# Patient Record
Sex: FEMALE | Race: WHITE | NOT HISPANIC OR LATINO | Employment: PART TIME | URBAN - METROPOLITAN AREA
[De-identification: names, ages, dates, MRNs, and addresses within clinical notes are randomized per-mention and may not be internally consistent; named-entity substitution may affect disease eponyms.]

---

## 2017-03-01 NOTE — TELEPHONE ENCOUNTER
Last routine GYN exam 7/14/2016; pap WNL.  States feels like she has had an HSV episode that will not completely go away.  Complaining of a lot of irritation near her rectum where she usually gets her outbreaks.  Symptoms started around 2/18.  Denies vaginal discharge.  Notified I will discuss with  and get back with her.  Patient verbalized understanding.  Patient states if  feels it is HSV will need new Rx sent to the pharmacy.      Please advise if patient needs to be seen or if send out another prescription for Valtrex 500mg

## 2017-03-01 NOTE — TELEPHONE ENCOUNTER
----- Message from Jose M Snell sent at 3/1/2017 11:16 AM CST -----  Contact: [phone].self  Pt would like to speak with the nurse about an vaginal infection.    Please advise

## 2017-03-02 RX ORDER — VALACYCLOVIR HYDROCHLORIDE 500 MG/1
500 TABLET, FILM COATED ORAL 2 TIMES DAILY
Qty: 30 TABLET | Refills: 0 | Status: SHIPPED | OUTPATIENT
Start: 2017-03-02 | End: 2017-07-14 | Stop reason: SDUPTHER

## 2017-03-02 RX ORDER — METRONIDAZOLE 7.5 MG/G
1 GEL VAGINAL NIGHTLY
Qty: 70 G | Refills: 0 | Status: SHIPPED | OUTPATIENT
Start: 2017-03-02 | End: 2017-04-05

## 2017-03-02 NOTE — TELEPHONE ENCOUNTER
----- Message from Andrew Rivas sent at 3/2/2017  4:14 PM CST -----  Contact: self/[phone]  She is returning the nurse's call.

## 2017-03-02 NOTE — TELEPHONE ENCOUNTER
Patient was notified Dr. Wang will treat for BV as well, patient verbalized understanding. States she prefers vaginal insert (Metrogel) also requesting her refill for Valacyclovir 500 mg tablet. Last GYN exam 07/14/16 (Pap: WNL) Pharmacy updated, please advise.

## 2017-03-30 ENCOUNTER — TELEPHONE (OUTPATIENT)
Dept: OBSTETRICS AND GYNECOLOGY | Facility: CLINIC | Age: 32
End: 2017-03-30

## 2017-03-30 NOTE — TELEPHONE ENCOUNTER
----- Message from Bing Nicolasa sent at 3/29/2017 12:51 PM CDT -----  Contact: self, [phone]  Patient called in requesting to speak with you regarding her insurance.  Please advise.

## 2017-03-30 NOTE — TELEPHONE ENCOUNTER
Patient called to schedule annual for 07/14/17 at 8:15am.   Patient would like to know if Dr. Wang would consider seeing her sister on Medicaid. States her sister is in her early 40's and has been having 3 periods a month. She was unsure but thinks her sister use to see Dr. Mary within the practice and hasn't seen no one since. Patient was informed Dr. Wang does not take Medicaid but messgae would be sent. She stated Dr. Wang made an exception for her but I believe patient may have been pregnant when she was seen at [facility]. Please advise.

## 2017-04-04 ENCOUNTER — TELEPHONE (OUTPATIENT)
Dept: OBSTETRICS AND GYNECOLOGY | Facility: CLINIC | Age: 32
End: 2017-04-04

## 2017-04-04 NOTE — TELEPHONE ENCOUNTER
"States she use the Metrogel but still having an "uncomfrotable" feeling.  Patient denies vaginal discharge but sometime feels like she can smell an abnormal odor "not strong but not normal".    Patient offered appointment tomorrow @ 1015 (cancelation).  Patient states really needs an afternoon appointment.   Requesting to be seen either tomorrow afternoon, Thursday afternoon or Friday morning.    Please advise  "

## 2017-04-04 NOTE — TELEPHONE ENCOUNTER
----- Message from Madhavi Hdez sent at 4/4/2017  8:09 AM CDT -----  No. [phone]   Patient still has the infection.   Please call.

## 2017-04-05 ENCOUNTER — OFFICE VISIT (OUTPATIENT)
Dept: OBSTETRICS AND GYNECOLOGY | Facility: CLINIC | Age: 32
End: 2017-04-05
Payer: MEDICAID

## 2017-04-05 VITALS
WEIGHT: 148.56 LBS | DIASTOLIC BLOOD PRESSURE: 64 MMHG | HEIGHT: 61 IN | BODY MASS INDEX: 28.05 KG/M2 | SYSTOLIC BLOOD PRESSURE: 100 MMHG

## 2017-04-05 DIAGNOSIS — K62.89 ANAL BURNING: Primary | ICD-10-CM

## 2017-04-05 PROCEDURE — 99999 PR PBB SHADOW E&M-EST. PATIENT-LVL II: CPT | Mod: PBBFAC,,, | Performed by: OBSTETRICS & GYNECOLOGY

## 2017-04-05 PROCEDURE — 99212 OFFICE O/P EST SF 10 MIN: CPT | Mod: PBBFAC,PO | Performed by: OBSTETRICS & GYNECOLOGY

## 2017-04-05 PROCEDURE — 99213 OFFICE O/P EST LOW 20 MIN: CPT | Mod: S$PBB,,, | Performed by: OBSTETRICS & GYNECOLOGY

## 2017-04-05 NOTE — PROGRESS NOTES
"OBSTETRIC HISTORY:   OB History      Para Term  AB TAB SAB Ectopic Multiple Living    3 2 2  1 1    2         COMPREHENSIVE GYN HISTORY:  PAP History: Denies abnormal Paps.  Infection History: Reports STDs: Herpes. Denies PID.  Benign History: Denies uterine fibroids. Denies ovarian cysts. Denies endometriosis.   Cancer History: Denies cervical cancer. Denies uterine cancer or hyperplasia. Denies ovarian cancer. Denies vulvar cancer or pre-cancer. Denies vaginal cancer or pre-cancer. Denies breast cancer. Denies colon cancer.  Sexual Activity History:  reports that she currently engages in sexual activity. She reports using the following method of birth control/protection: Implant.   Menstrual History:  Every 28 days, flows for 3 days. Moderate flow.  Dysmenorrhea History: Denies dysmenorrhea.  Contraception: Nexplanon  Inserted 2014     HPI:   31 y.o.  No LMP recorded. Patient has had an implant.   Patient is  here complaining of uncomfortable feeling near anus where she previously had HSV (she actually feels better now). Gets slight burning when she wipes. She occ. Uses baby wipes.. She denies bladder, breast and bowel complaints.    ROS:  GENERAL: Denies weight gain or weight loss. Feeling well overall.   SKIN: Denies rash or lesions.   HEAD: Denies headache.   NODES: Denies enlarged lymph nodes.   CHEST: Denies shortness of breath.   ABDOMEN: No abdominal pain, constipation, diarrhea, nausea, vomiting or rectal bleeding.   URINARY: No frequency, dysuria, hematuria, or burning on urination.  REPRODUCTIVE: See HPI.   BREASTS: The patient denies pain, lumps, or nipple discharge.   HEMATOLOGIC: No easy bruisability.   MUSCULOSKELETAL: Denies joint pain or back pain.   NEUROLOGIC: Denies weakness.   PSYCHIATRIC: Denies depression, anxiety or mood swings.    PE:   /64  Ht 5' 1" (1.549 m)  Wt 67.4 kg (148 lb 9.4 oz)  BMI 28.08 kg/m2  APPEARANCE: Well nourished, well developed, in no acute " distress.  ABDOMEN: Soft. No tenderness or masses. No hernias.  PELVIC:   VULVA/ANUS: No lesions but there maybe a resolving external hemorrhoid. No erosive lesions noted. Normal female genitalia.    ASSESSMENT:  Anal discomfort-- most likely hemorrhoid but since better could have also been HSV    PLAN:  RTO prn and for annual

## 2017-07-10 ENCOUNTER — TELEPHONE (OUTPATIENT)
Dept: OBSTETRICS AND GYNECOLOGY | Facility: CLINIC | Age: 32
End: 2017-07-10

## 2017-07-10 NOTE — TELEPHONE ENCOUNTER
Wanted to know if she is having her Nexplanon replaced or just having her annual Friday?  Notified we are only doing her annual on Friday and we can have her sign the order for her replacement at that time.  Once we have the product she will return for replacement.  Patient verbalized understanding

## 2017-07-10 NOTE — TELEPHONE ENCOUNTER
----- Message from Andreina Duque sent at 7/10/2017  1:38 PM CDT -----  Contact: [phone]/self  Patient called in requesting to speak with you. Patient prefers to speak with a nurse. Please advise.

## 2017-07-14 ENCOUNTER — LAB VISIT (OUTPATIENT)
Dept: LAB | Facility: HOSPITAL | Age: 32
End: 2017-07-14
Attending: OBSTETRICS & GYNECOLOGY
Payer: MEDICAID

## 2017-07-14 ENCOUNTER — OFFICE VISIT (OUTPATIENT)
Dept: OBSTETRICS AND GYNECOLOGY | Facility: CLINIC | Age: 32
End: 2017-07-14
Payer: MEDICAID

## 2017-07-14 VITALS
WEIGHT: 151.44 LBS | SYSTOLIC BLOOD PRESSURE: 110 MMHG | HEIGHT: 61 IN | DIASTOLIC BLOOD PRESSURE: 62 MMHG | BODY MASS INDEX: 28.59 KG/M2

## 2017-07-14 DIAGNOSIS — Z01.419 ROUTINE GYNECOLOGICAL EXAMINATION: Primary | ICD-10-CM

## 2017-07-14 DIAGNOSIS — N89.8 VAGINAL DISCHARGE: ICD-10-CM

## 2017-07-14 DIAGNOSIS — Z12.4 SCREENING FOR MALIGNANT NEOPLASM OF THE CERVIX: ICD-10-CM

## 2017-07-14 DIAGNOSIS — Z11.3 SCREEN FOR STD (SEXUALLY TRANSMITTED DISEASE): ICD-10-CM

## 2017-07-14 DIAGNOSIS — R35.0 URINARY FREQUENCY: ICD-10-CM

## 2017-07-14 PROCEDURE — 88175 CYTOPATH C/V AUTO FLUID REDO: CPT

## 2017-07-14 PROCEDURE — 86703 HIV-1/HIV-2 1 RESULT ANTBDY: CPT

## 2017-07-14 PROCEDURE — 86803 HEPATITIS C AB TEST: CPT

## 2017-07-14 PROCEDURE — 99999 PR PBB SHADOW E&M-EST. PATIENT-LVL II: CPT | Mod: PBBFAC,,, | Performed by: OBSTETRICS & GYNECOLOGY

## 2017-07-14 PROCEDURE — 87660 TRICHOMONAS VAGIN DIR PROBE: CPT

## 2017-07-14 PROCEDURE — 87591 N.GONORRHOEAE DNA AMP PROB: CPT

## 2017-07-14 PROCEDURE — 36415 COLL VENOUS BLD VENIPUNCTURE: CPT

## 2017-07-14 PROCEDURE — 99212 OFFICE O/P EST SF 10 MIN: CPT | Mod: PBBFAC,PO | Performed by: OBSTETRICS & GYNECOLOGY

## 2017-07-14 PROCEDURE — 87340 HEPATITIS B SURFACE AG IA: CPT

## 2017-07-14 PROCEDURE — 86592 SYPHILIS TEST NON-TREP QUAL: CPT

## 2017-07-14 PROCEDURE — 87480 CANDIDA DNA DIR PROBE: CPT

## 2017-07-14 PROCEDURE — 99395 PREV VISIT EST AGE 18-39: CPT | Mod: S$PBB,,, | Performed by: OBSTETRICS & GYNECOLOGY

## 2017-07-14 PROCEDURE — 87086 URINE CULTURE/COLONY COUNT: CPT

## 2017-07-14 RX ORDER — BUPROPION HYDROCHLORIDE 300 MG/1
300 TABLET ORAL EVERY MORNING
Qty: 30 TABLET | Refills: 2 | Status: SHIPPED | OUTPATIENT
Start: 2017-07-14 | End: 2017-09-28

## 2017-07-14 RX ORDER — VALACYCLOVIR HYDROCHLORIDE 500 MG/1
500 TABLET, FILM COATED ORAL 2 TIMES DAILY
Qty: 30 TABLET | Refills: 5 | Status: SHIPPED | OUTPATIENT
Start: 2017-07-14 | End: 2017-12-22 | Stop reason: SDUPTHER

## 2017-07-14 NOTE — PROGRESS NOTES
"OBSTETRIC HISTORY:   OB History      Para Term  AB Living    3 2 2   1 2    SAB TAB Ectopic Multiple Live Births      1     2         COMPREHENSIVE GYN HISTORY:  PAP History: Denies abnormal Paps.  Infection History: Reports STDs: Herpes. Denies PID.  Benign History: Denies uterine fibroids. Denies ovarian cysts. Denies endometriosis.   Cancer History: Denies cervical cancer. Denies uterine cancer or hyperplasia. Denies ovarian cancer. Denies vulvar cancer or pre-cancer. Denies vaginal cancer or pre-cancer. Denies breast cancer. Denies colon cancer.  Sexual Activity History:  reports that she currently engages in sexual activity. She reports using the following method of birth control/protection: Implant.   Menstrual History:  Every 28 days, flows for 3 days. Moderate flow.  Dysmenorrhea History: Denies dysmenorrhea.  Contraception: Nexplanon  Inserted 2014      HPI:   32 y.o.  Patient's last menstrual period was 2017.   Patient is  here for her annual gynecologic exam.  She has complaints urinary frequency, vulvar irritation and wants std check. Also, states she is a hoarder and wants to know if there is any medication to reduce her anxiety related to her hoarding. She denies breast and bowel complaints.    ROS:  GENERAL: Denies weight gain or weight loss. Feeling well overall.   SKIN: Denies rash or lesions.   HEAD: Denies headache.   NODES: Denies enlarged lymph nodes.   CHEST: Denies shortness of breath.   ABDOMEN: No abdominal pain, constipation, diarrhea, nausea, vomiting or rectal bleeding.   URINARY: No dysuria, hematuria, or burning on urination.  REPRODUCTIVE: See HPI.   BREASTS: The patient denies pain, lumps, or nipple discharge.   HEMATOLOGIC: No easy bruisability.   MUSCULOSKELETAL: Denies joint pain or back pain.   NEUROLOGIC: Denies weakness.   PSYCHIATRIC: Denies depression, anxiety or mood swings.    PE:   /62   Ht 5' 1" (1.549 m)   Wt 68.7 kg (151 lb 7.3 oz)   LMP " 03/01/2017   BMI 28.62 kg/m²   APPEARANCE: Well nourished, well developed, in no acute distress.  NECK: Neck symmetric without  thyromegaly.  NODES: No inguinal, cervical lymph node enlargement.  CHEST: Lungs clear to auscultation.  HEART: Regular rate and rhythm, no murmurs, rubs or gallops.  ABDOMEN: Soft. No tenderness or masses. No hernias.  BREASTS: Symmetrical, no skin changes or visible lesions. No palpable masses, nipple discharge or adenopathy bilaterally.  PELVIC:   VULVA: No lesions. Normal female genitalia.  URETHRAL MEATUS: Normal size and location, no lesions, no prolapse.  URETHRA: No masses, tenderness, prolapse or scarring.  VAGINA: Moist and well rugated, minimal discharge, no significant cystocele or rectocele.  CERVIX: No lesions and discharge.  UTERUS: Normal size, regular shape, mobile, non-tender, bladder base nontender.  ADNEXA: No tenderness. Left adnexal fullness.    PROCEDURES:  Pap smear  UA=Leukocytes  UPT=negative  GC/CT/Affirm  HIV, RPR, Hep B, Hep C    Assessment:  Normal Gynecologic Exam--replace Nexplanon soon  STD check  Vaginal discharge  Left ovarian cyst  Urinary frequency  Hoarding-- try Wellbutrin and if not better try Buspar    Plan:  Mammogram and Colonoscopy if indicated by current recommendations.  Return to clinic in one year or for any problems or complaints.    Counseling:  Patient was counseled today on A.C.S. Pap guidelines and recommendations for yearly pelvic exams and monthly self breast exams; to see her PCP for other health maintenance. Regular exercise and healthy diet.

## 2017-07-15 LAB
C TRACH DNA SPEC QL NAA+PROBE: NOT DETECTED
N GONORRHOEA DNA SPEC QL NAA+PROBE: NOT DETECTED
RPR SER QL: NORMAL

## 2017-07-16 ENCOUNTER — TELEPHONE (OUTPATIENT)
Dept: OBSTETRICS AND GYNECOLOGY | Facility: HOSPITAL | Age: 32
End: 2017-07-16

## 2017-07-16 LAB
BACTERIA UR CULT: NORMAL
BACTERIA UR CULT: NORMAL
CANDIDA RRNA VAG QL PROBE: NEGATIVE
G VAGINALIS RRNA GENITAL QL PROBE: NEGATIVE
T VAGINALIS RRNA GENITAL QL PROBE: NEGATIVE

## 2017-07-17 ENCOUNTER — TELEPHONE (OUTPATIENT)
Dept: OBSTETRICS AND GYNECOLOGY | Facility: CLINIC | Age: 32
End: 2017-07-17

## 2017-07-17 LAB
HBV SURFACE AG SERPL QL IA: NEGATIVE
HCV AB SERPL QL IA: NEGATIVE
HIV 1+2 AB+HIV1 P24 AG SERPL QL IA: NEGATIVE

## 2017-08-02 ENCOUNTER — TELEPHONE (OUTPATIENT)
Dept: OBSTETRICS AND GYNECOLOGY | Facility: CLINIC | Age: 32
End: 2017-08-02

## 2017-08-02 RX ORDER — TRIAMCINOLONE ACETONIDE 1 MG/G
CREAM TOPICAL 2 TIMES DAILY
Qty: 45 G | Refills: 0 | Status: SHIPPED | OUTPATIENT
Start: 2017-08-02 | End: 2017-09-28

## 2017-08-02 NOTE — TELEPHONE ENCOUNTER
Notify all cultures negative 7/14/17.  I will send a steroid cream to pharmacy for her to use BID x 2 weeks on vulva

## 2017-08-02 NOTE — TELEPHONE ENCOUNTER
Patient has complaints of irritation. States that there is a small amount of discharge. Notices the irritation more when she wipes. No reports of odor or breakout. No painful or burning urination.    Please advise.

## 2017-08-02 NOTE — TELEPHONE ENCOUNTER
Patient informed that rx has been called in to the pharmacy. Patient directed to use medication externally to affected area. If symptoms do not resolve patient is to schedule appt for evaluation. All questions answered and patient verbalized understanding.

## 2017-08-02 NOTE — TELEPHONE ENCOUNTER
----- Message from Andreina Duque sent at 8/2/2017  2:09 PM CDT -----  Contact: [phone]/self  Patient requesting to speak with you regarding medication for yeast infection. Please advise.

## 2017-08-16 ENCOUNTER — TELEPHONE (OUTPATIENT)
Dept: OBSTETRICS AND GYNECOLOGY | Facility: CLINIC | Age: 32
End: 2017-08-16

## 2017-08-16 DIAGNOSIS — N94.9 ADNEXAL FULLNESS: Primary | ICD-10-CM

## 2017-08-16 NOTE — TELEPHONE ENCOUNTER
Patient calling to check status of birth control, states she brought her new insurance information last Wednesday.

## 2017-08-16 NOTE — TELEPHONE ENCOUNTER
----- Message from Diann Ty sent at 8/16/2017  8:53 AM CDT -----  Contact: [phone]  Patient states she left a message to get her birth control change and would like to get an appt. To see the  To discuss

## 2017-08-29 NOTE — TELEPHONE ENCOUNTER
----- Message from Cassidy Weaver sent at 8/29/2017  9:36 AM CDT -----  Contact: [phone]/ self   Patient called in requesting to speak with you. Did not specify why. Please advise.

## 2017-08-31 NOTE — TELEPHONE ENCOUNTER
Patient states she is unable to schedule her ultrasound because there is no order in.  Also waiting to hear about her Nexplanon.   Notified [facility] may be trying to contact her to confirm shipment request.  Patient states she has not received a call.  Notified I will get an order for her ultrasound and contact [facility] to find out the status of her Nexplanon.    Per Alonzo at [facility] they have attempted to contact patient and have not been able to reach her.  I confirmed the contact patient for the patient.  Per Alonzo patient can call [facility] at [phone] to complete her enrollment and authorize shipment.    Please sign pended ultrasound order and route back

## 2017-08-31 NOTE — TELEPHONE ENCOUNTER
----- Message from Diann Ty sent at 8/31/2017 11:40 AM CDT -----  Contact: [phone]  Patient is returning your call

## 2017-09-01 ENCOUNTER — TELEPHONE (OUTPATIENT)
Dept: OBSTETRICS AND GYNECOLOGY | Facility: CLINIC | Age: 32
End: 2017-09-01

## 2017-09-01 NOTE — TELEPHONE ENCOUNTER
----- Message from Bing Baldwin sent at 9/1/2017 12:29 PM CDT -----  Contact: [facility], [phone]  Calling to schedule Nexplanon delivery. Please advise.

## 2017-09-01 NOTE — TELEPHONE ENCOUNTER
Patient notified and verbalized understanding.  Patient will call [facility] and U/S only scheduled for 9/8/17 @ 8679

## 2017-09-08 ENCOUNTER — TELEPHONE (OUTPATIENT)
Dept: OBSTETRICS AND GYNECOLOGY | Facility: CLINIC | Age: 32
End: 2017-09-08

## 2017-09-08 ENCOUNTER — OFFICE VISIT (OUTPATIENT)
Dept: OBSTETRICS AND GYNECOLOGY | Facility: CLINIC | Age: 32
End: 2017-09-08
Payer: MEDICAID

## 2017-09-08 DIAGNOSIS — N94.9 ADNEXAL FULLNESS: ICD-10-CM

## 2017-09-08 PROCEDURE — 76830 TRANSVAGINAL US NON-OB: CPT | Mod: 26,S$PBB,, | Performed by: OBSTETRICS & GYNECOLOGY

## 2017-09-08 PROCEDURE — 76830 TRANSVAGINAL US NON-OB: CPT | Mod: PBBFAC,PO

## 2017-09-08 NOTE — TELEPHONE ENCOUNTER
Nexplanon received in the office.  Notified per  can do removal with insertion next Thursday 9/14/17 @ 1pm.  Patient verbalized understanding

## 2017-09-08 NOTE — TELEPHONE ENCOUNTER
Notify ultrasound showed left ovarian cyst. No treatment needed. RTO 3 months for follow up ultrasound

## 2017-09-11 ENCOUNTER — TELEPHONE (OUTPATIENT)
Dept: OBSTETRICS AND GYNECOLOGY | Facility: CLINIC | Age: 32
End: 2017-09-11

## 2017-09-11 NOTE — TELEPHONE ENCOUNTER
----- Message from Yari Shetty sent at 9/11/2017 12:19 PM CDT -----  Contact: self/[phone]  Patient needs to reschedule her appointment for the Nexplanon removal/re-insertion.    Please call and advise.

## 2017-09-15 ENCOUNTER — TELEPHONE (OUTPATIENT)
Dept: OBSTETRICS AND GYNECOLOGY | Facility: CLINIC | Age: 32
End: 2017-09-15

## 2017-09-15 NOTE — TELEPHONE ENCOUNTER
----- Message from Shana Chandler sent at 9/15/2017  2:33 PM CDT -----  Contact: Self [phone]  Patient Returning Your Phone Call

## 2017-09-18 NOTE — TELEPHONE ENCOUNTER
----- Message from Jose M Snell sent at 9/18/2017  4:39 PM CDT -----  Contact: [phone]/self   Pt states she's returning your call.    Please call and advise

## 2017-09-28 ENCOUNTER — OFFICE VISIT (OUTPATIENT)
Dept: OBSTETRICS AND GYNECOLOGY | Facility: CLINIC | Age: 32
End: 2017-09-28
Payer: MEDICAID

## 2017-09-28 VITALS
BODY MASS INDEX: 27.8 KG/M2 | DIASTOLIC BLOOD PRESSURE: 66 MMHG | HEIGHT: 61 IN | WEIGHT: 147.25 LBS | SYSTOLIC BLOOD PRESSURE: 110 MMHG

## 2017-09-28 DIAGNOSIS — Z30.017 NEXPLANON INSERTION: ICD-10-CM

## 2017-09-28 DIAGNOSIS — Z30.46 ENCOUNTER FOR NEXPLANON REMOVAL: Primary | ICD-10-CM

## 2017-09-28 LAB
B-HCG UR QL: NEGATIVE
CTP QC/QA: YES

## 2017-09-28 PROCEDURE — 11981 INSERTION DRUG DLVR IMPLANT: CPT | Mod: PBBFAC,PO | Performed by: OBSTETRICS & GYNECOLOGY

## 2017-09-28 PROCEDURE — 11983 REMOVE/INSERT DRUG IMPLANT: CPT | Mod: S$PBB,,, | Performed by: OBSTETRICS & GYNECOLOGY

## 2017-09-28 PROCEDURE — 99212 OFFICE O/P EST SF 10 MIN: CPT | Mod: PBBFAC,PO,25 | Performed by: OBSTETRICS & GYNECOLOGY

## 2017-09-28 PROCEDURE — 99999 PR PBB SHADOW E&M-EST. PATIENT-LVL II: CPT | Mod: PBBFAC,,, | Performed by: OBSTETRICS & GYNECOLOGY

## 2017-09-28 PROCEDURE — 11982 REMOVE DRUG IMPLANT DEVICE: CPT | Mod: PBBFAC,PO | Performed by: OBSTETRICS & GYNECOLOGY

## 2017-09-28 PROCEDURE — 81025 URINE PREGNANCY TEST: CPT | Mod: PBBFAC,PO | Performed by: OBSTETRICS & GYNECOLOGY

## 2017-09-28 PROCEDURE — 99499 UNLISTED E&M SERVICE: CPT | Mod: S$PBB,,, | Performed by: OBSTETRICS & GYNECOLOGY

## 2017-09-28 NOTE — PROGRESS NOTES
IMPLANON REMOVAL:    32 y.o. female   OB History      Para Term  AB Living    3 2 2   1 2    SAB TAB Ectopic Multiple Live Births      1     2        with Patient's last menstrual period was 2017.  presents for Implanon removal because patient desires pregnancy or different birth control option.      PRE-IMPLANON REMOVAL COUNSELING:  The patient was advised of minimal risks of bleeding and pain and she agrees to proceed.    EXAM:  Implanon palpable by palpation or imaging.  The end closest to the elbow was marked.    PROCEDURE:  TIME OUT PERFORMED.  The patient was placed in same position as for insertion.  The area was prepped with antiseptic.  2 cc of 1% Xylocaine with epinephrine was injected just underneath end of implant closest to the elbow.  Downward pressure was placed on the end of the implant closest to the axilla.  Using sterile technique, a 2-3 mm incision was made in longitudinal direction of arm at tip of the implant closest to the elbow.  The implant was gently pushed toward the incision until tip was visible.  The entire 4 cm implant was removed.  The new Nexplanon was then inserted. The patient tolerated the procedure well.    ASSESSMENT:  Contraceptive Management / Removal Implanon. V25.0.

## 2017-09-28 NOTE — PROGRESS NOTES
PRE-NEXPLANON INSERTION COUNSELING:  All contraceptive options were reviewed and the patient chooses Nexplanon.  Patients history was reviewed and there were no contraindications to Nexplanon.  The procedure and minimal risks of pain, bleeding, bruising and infection at the insertion site discussed. Possible irregular menstrual bleeding pattern versus amenorrhea was explained.  No protection against STDs discussed.  Written information provided; all questions answered and patient agrees to proceed.  Consent signed and scanned into computer.    LOT #  [id number]   EXP: 1/2020    EXAM:  With patient in supine position the nondominant arm was flexed at the elbow and externally rotated.  The insertion site was identified 6-8 cm above the elbow crease at the inner side of the upper arm overlying the groove between biceps and triceps.  The insertion site was marked and a second tiffanie was placed 6-8 cm above the first.    PROCEDURE:  TIME OUT PERFORMED.  The insertion site was prepped with antiseptic and injected with 8 cc of 1% Xylocaine with epinephrine subq along the planned insertion canal.  Xylocaine subq along the planned insertion canal.  Using sterile technique the Implanon applicator was visually verified and removed from the blister pack.  The base of the applicator was gently tapped with the needle pointed up until the implant disappeared back into the needle and the needle cap was removed.  The needle tip was inserted bevel side up at a 20 degree angle to penetrate the skin.  The applicator was lowered parallel to the arm and the skin was tented with the needle.  The seal of the applicator was broken by pressing the obturator support and turned 90degrees.  The obturator tip was fixed in place on the arm with one hand and with the other hand the needle was slowly and fully retracted back along full length of the obturator.  The grooved tip of the obturator was visible inside the needle and the implant waspalpable  after insertion.  A small adhesive bandage and then a pressure bandage was placed over the insertion site. The patient tolerated the procedure well.    ASSESSMENT:  1. Contraception management / Nexplanon insertion.V25.0.    POST IMPLANON INSERTION COUNSELING:  Manage post Implanon placement arm pain with NSAIDs, Tylenol or Rx per MedCard.  Keep arm elevated and apply intermittent ice packs to decrease pain and bruising for 24 Hours.  May remove bandage in 24 hours.  Implanon danger signs (worsening pain at insertion site, bleeding through bandage, redness and/or pus drainage at insertion site).  Removal in 3 years.    Counseling lasted approximately 15 minutes and all her questions were answered.

## 2017-12-19 ENCOUNTER — TELEPHONE (OUTPATIENT)
Dept: OBSTETRICS AND GYNECOLOGY | Facility: CLINIC | Age: 32
End: 2017-12-19

## 2017-12-19 NOTE — TELEPHONE ENCOUNTER
Returned pt's call. Pt states she is experiencing vaginal irritation, no itching, burning or discharge just a little odor. Pt states she still have Kenalog cream Dr. Wang prescribed and wanted to know if she can use it. Informed pt Dr. Wang is out of the office and to try the cream and f/u next week if symptoms persist or get worse. Patient verbalized understanding.

## 2017-12-19 NOTE — TELEPHONE ENCOUNTER
----- Message from Yari Shetty sent at 12/19/2017  8:36 AM CST -----  Contact: self/[phone]  Patient called to ask Deirdre a question in regard to a previous medication for feminine irritation.      Please call and  Advise.

## 2017-12-22 ENCOUNTER — OFFICE VISIT (OUTPATIENT)
Dept: URGENT CARE | Facility: CLINIC | Age: 32
End: 2017-12-22
Payer: MEDICAID

## 2017-12-22 VITALS
RESPIRATION RATE: 16 BRPM | DIASTOLIC BLOOD PRESSURE: 76 MMHG | SYSTOLIC BLOOD PRESSURE: 111 MMHG | BODY MASS INDEX: 28.22 KG/M2 | OXYGEN SATURATION: 99 % | WEIGHT: 140 LBS | HEART RATE: 74 BPM | TEMPERATURE: 98 F | HEIGHT: 59 IN

## 2017-12-22 DIAGNOSIS — N39.0 URINARY TRACT INFECTION WITHOUT HEMATURIA, SITE UNSPECIFIED: ICD-10-CM

## 2017-12-22 DIAGNOSIS — N76.0 ACUTE VAGINITIS: ICD-10-CM

## 2017-12-22 DIAGNOSIS — R10.9 ABDOMINAL PAIN, UNSPECIFIED ABDOMINAL LOCATION: Primary | ICD-10-CM

## 2017-12-22 LAB
B-HCG UR QL: NEGATIVE
BILIRUB UR QL STRIP: NEGATIVE
CTP QC/QA: YES
GLUCOSE UR QL STRIP: NEGATIVE
KETONES UR QL STRIP: NEGATIVE
LEUKOCYTE ESTERASE UR QL STRIP: POSITIVE
PH, POC UA: 5.5 (ref 5–8)
POC BLOOD, URINE: NEGATIVE
POC NITRATES, URINE: NEGATIVE
PROT UR QL STRIP: NEGATIVE
SP GR UR STRIP: 1.03 (ref 1–1.03)
UROBILINOGEN UR STRIP-ACNC: ABNORMAL (ref 0.1–1.1)

## 2017-12-22 PROCEDURE — 81003 URINALYSIS AUTO W/O SCOPE: CPT | Mod: QW,S$GLB,, | Performed by: SURGERY

## 2017-12-22 PROCEDURE — 81025 URINE PREGNANCY TEST: CPT | Mod: S$GLB,,, | Performed by: SURGERY

## 2017-12-22 PROCEDURE — 99203 OFFICE O/P NEW LOW 30 MIN: CPT | Mod: 25,S$GLB,, | Performed by: SURGERY

## 2017-12-22 RX ORDER — FLUCONAZOLE 150 MG/1
150 TABLET ORAL DAILY
Qty: 1 TABLET | Refills: 2 | Status: SHIPPED | OUTPATIENT
Start: 2017-12-22 | End: 2017-12-23

## 2017-12-22 RX ORDER — VALACYCLOVIR HYDROCHLORIDE 500 MG/1
500 TABLET, FILM COATED ORAL 2 TIMES DAILY
Qty: 6 TABLET | Refills: 10 | Status: SHIPPED | OUTPATIENT
Start: 2017-12-22 | End: 2018-08-20

## 2017-12-22 RX ORDER — CLOTRIMAZOLE AND BETAMETHASONE DIPROPIONATE 10; .64 MG/G; MG/G
CREAM TOPICAL
Qty: 15 G | Refills: 1 | Status: SHIPPED | OUTPATIENT
Start: 2017-12-22 | End: 2018-08-20

## 2017-12-22 NOTE — PROGRESS NOTES
"Subjective:       Patient ID: Deirdre Rodriguez is a 32 y.o. female.    Vitals:  height is 4' 11" (1.499 m) and weight is 63.5 kg (140 lb). Her tympanic temperature is 98 °F (36.7 °C). Her blood pressure is 111/76 and her pulse is 74. Her respiration is 16 and oxygen saturation is 99%.     Chief Complaint: Dysuria and Vaginal Discharge    Patient states her partner used a condom on 12/19/2017 and her symptoms started on 12/20/2017. She has a hx of prior HSV infection and think may be having an outbreak. She thinks she has an yeast infection/allergic reaction to latex.       Dysuria    This is a new problem. The current episode started in the past 7 days. The problem occurs every urination. The problem has been unchanged. The quality of the pain is described as burning and aching. The pain is at a severity of 4/10. The pain is mild. There has been no fever. She is sexually active. Pertinent negatives include no chills, hematuria, nausea, urgency or vomiting. She has tried nothing for the symptoms.   Vaginal Discharge   The patient's primary symptoms include a genital odor and vaginal discharge. The patient's pertinent negatives include no missed menses. This is a new problem. The current episode started in the past 7 days. The problem occurs constantly. The problem has been gradually worsening. The patient is experiencing no pain. She is not pregnant. Associated symptoms include dysuria. Pertinent negatives include no abdominal pain, back pain, chills, fever, hematuria, nausea, urgency or vomiting. The vaginal discharge was clear and thin. There has been no bleeding. She has not been passing clots. She has not been passing tissue. Nothing aggravates the symptoms. Treatments tried: Triamcinolone 0.1% cream. The treatment provided no relief. She is sexually active. No, her partner does not have an STD. She uses an IUD for contraception. Her menstrual history has been irregular.     Review of Systems   Constitution: Negative " for chills and fever.   Skin: Negative for itching.   Musculoskeletal: Negative for back pain.   Gastrointestinal: Negative for abdominal pain, nausea and vomiting.   Genitourinary: Positive for dysuria and vaginal discharge. Negative for genital sores, hematuria, missed menses, non-menstrual bleeding and urgency.       Objective:      Physical Exam   Constitutional: She is oriented to person, place, and time. She appears well-developed and well-nourished. She is cooperative.  Non-toxic appearance. She does not appear ill. No distress.   HENT:   Head: Normocephalic and atraumatic.   Right Ear: Hearing, tympanic membrane, external ear and ear canal normal.   Left Ear: Hearing, tympanic membrane, external ear and ear canal normal.   Nose: No nasal deformity. No epistaxis. Right sinus exhibits no maxillary sinus tenderness and no frontal sinus tenderness. Left sinus exhibits no maxillary sinus tenderness and no frontal sinus tenderness.   Mouth/Throat: Uvula is midline, oropharynx is clear and moist and mucous membranes are normal. No trismus in the jaw. Normal dentition. No uvula swelling. No posterior oropharyngeal erythema.   Eyes: Conjunctivae and lids are normal. No scleral icterus.   Sclera clear bilat   Neck: Trachea normal, full passive range of motion without pain and phonation normal. Neck supple.   Cardiovascular: Normal rate, regular rhythm, normal heart sounds, intact distal pulses and normal pulses.    Pulmonary/Chest: Effort normal and breath sounds normal. No respiratory distress.   Abdominal: Soft. Normal appearance and bowel sounds are normal. She exhibits no distension. There is no tenderness.   Musculoskeletal: Normal range of motion. She exhibits no edema or deformity.   Neurological: She is alert and oriented to person, place, and time. She exhibits normal muscle tone. Coordination normal.   Skin: Skin is warm, dry and intact. She is not diaphoretic. No pallor.   Psychiatric: She has a normal mood  and affect. Her speech is normal and behavior is normal. Judgment and thought content normal. Cognition and memory are normal.   Nursing note and vitals reviewed.    Gyn exam deferred per request  Assessment:       1. Abdominal pain, unspecified abdominal location        Plan:         Abdominal pain, unspecified abdominal location  -     POCT Urinalysis, Dipstick, Automated, W/O Scope  -     POCT urine pregnancy  -     fluconazole (DIFLUCAN) 150 MG Tab; Take 1 tablet (150 mg total) by mouth once daily.  Dispense: 1 tablet; Refill: 2  -     C. trachomatis/N. gonorrhoeae by AMP DNA Urine  -     Urine culture  -     valACYclovir (VALTREX) 500 MG tablet; Take 1 tablet (500 mg total) by mouth 2 (two) times daily.  Dispense: 6 tablet; Refill: 10    Other orders  -     clotrimazole-betamethasone 1-0.05% (LOTRISONE) cream; Apply to affected area 2 times daily  Dispense: 15 g; Refill: 1

## 2017-12-22 NOTE — PATIENT INSTRUCTIONS
"  Dysuria     Painful urination (dysuria) is often caused by a problem in the urinary tract.   Dysuria is pain felt during urination. It is often described as a burning. Learn more about this problem and how it can be treated.  What causes dysuria?  Possible causes include:  · Infection with a bacteria or virus such as a urinary tract infection (UTI or a sexually transmitted infection (STI)  · Sensitivity or allergy to chemicals such as those found in lotions and other products  · Prostate or bladder problems  · Radiation therapy to the pelvic area  How is dysuria diagnosed?  Your healthcare provider will examine you. He or she will ask about your symptoms and health. After talking with you and doing a physical exam, your healthcare provider may know what is causing your dysuria. He or she will usually request  a sample of your urine. Tests of your urine, or a "urinalysis," are done. A urinalysis may include:  · Looking at the urine sample (visual exam)  · Checking for substances (chemical exam)  · Looking at a small amount under a microscope (microscopic exam)  Some parts of the urinalysis may be done in the provider's office and some in a lab. And, the urine sample may be checked for bacteria and yeast (urine culture). Your healthcare provider will tell you more about these tests if they are needed.  How is dysuria treated?  Treatment depends on the cause. If you have a bacterial infection, you may need antibiotics. You may be given medicines to make it easier for you to urinate and help relieve pain. Your healthcare provider can tell you more about your treatment options. Untreated, symptoms may get worse.  When to call your healthcare provider  Call the healthcare provider right away if you have any of the following:  · Fever of 100.4°F (38°C) or higher   · No improvement after three days of treatment  · Trouble urinating because of pain  · New or increased discharge from the vagina or penis  · Rash or joint " pain  · Increased back or abdominal pain  · Enlarged painful lymph nodes (lumps) in the groin   Date Last Reviewed: 1/1/2017  © 8601-8755 [employer]. [address] [place] [address], [place] [address]. All rights reserved. This information is not intended as a substitute for professional medical care. Always follow your healthcare professional's instructions.

## 2017-12-27 ENCOUNTER — TELEPHONE (OUTPATIENT)
Dept: URGENT CARE | Facility: CLINIC | Age: 32
End: 2017-12-27

## 2017-12-27 PROBLEM — N39.0 URINARY TRACT INFECTION WITHOUT HEMATURIA: Status: ACTIVE | Noted: 2017-12-27

## 2017-12-27 LAB
BACTERIA UR CULT: NORMAL
C TRACH RRNA SPEC QL NAA+PROBE: NEGATIVE
N GONORRHOEA RRNA SPEC QL NAA+PROBE: NEGATIVE

## 2017-12-27 RX ORDER — NITROFURANTOIN 25; 75 MG/1; MG/1
100 CAPSULE ORAL 2 TIMES DAILY
Qty: 14 CAPSULE | Refills: 0 | Status: SHIPPED | OUTPATIENT
Start: 2017-12-27 | End: 2018-01-03

## 2017-12-28 NOTE — TELEPHONE ENCOUNTER
Called patient, left message informing patient to call office back. I was calling to inform patient of her results. Her urine culture did show some bacteria that we need to treat.

## 2017-12-28 NOTE — TELEPHONE ENCOUNTER
----- Message from Phyllis Shelton MD sent at 12/27/2017  5:10 PM CST -----  Please call the patient regarding her abnormal result.  Her urine culture did show some bacteria that we need to treat.  I will send an antibiotic to her pharmacy. Take it till gone and follow up with PCP.

## 2018-02-06 ENCOUNTER — CLINICAL SUPPORT (OUTPATIENT)
Dept: URGENT CARE | Facility: CLINIC | Age: 33
End: 2018-02-06

## 2018-02-06 ENCOUNTER — TELEPHONE (OUTPATIENT)
Dept: OBSTETRICS AND GYNECOLOGY | Facility: CLINIC | Age: 33
End: 2018-02-06

## 2018-02-06 DIAGNOSIS — Z00.00 PHYSICAL EXAM: Primary | ICD-10-CM

## 2018-02-06 DIAGNOSIS — Z02.83 ENCOUNTER FOR DRUG SCREENING: Primary | ICD-10-CM

## 2018-02-06 PROCEDURE — 80305 DRUG TEST PRSMV DIR OPT OBS: CPT | Mod: S$GLB,,, | Performed by: PREVENTIVE MEDICINE

## 2018-02-06 PROCEDURE — 99199 UNLISTED SPECIAL SVC PX/RPRT: CPT | Mod: S$GLB,,, | Performed by: PREVENTIVE MEDICINE

## 2018-02-06 PROCEDURE — 99499 UNLISTED E&M SERVICE: CPT | Mod: S$GLB,,, | Performed by: PREVENTIVE MEDICINE

## 2018-02-06 NOTE — TELEPHONE ENCOUNTER
----- Message from Autumn Martinez sent at 2/6/2018 10:02 AM CST -----  Contact: [phone]/self  Patient is requesting a call back regarding about medication she takes.Please advise.

## 2018-03-27 ENCOUNTER — TELEPHONE (OUTPATIENT)
Dept: OBSTETRICS AND GYNECOLOGY | Facility: CLINIC | Age: 33
End: 2018-03-27

## 2018-03-27 RX ORDER — FLUTICASONE PROPIONATE 50 MCG
SPRAY, SUSPENSION (ML) NASAL
Refills: 0 | COMMUNITY
Start: 2017-12-29 | End: 2018-08-20

## 2018-03-27 NOTE — TELEPHONE ENCOUNTER
----- Message from Cindy Trivedi sent at 3/27/2018 10:12 AM CDT -----  Contact: Self/ [phone]  Patient called in requesting to speak with you. Patient prefers to speak with a nurse.     Please call and advise.

## 2018-03-27 NOTE — TELEPHONE ENCOUNTER
Pt wanted to know if Dr Wang could fill out a physical for work. Advised pt that she needs to see PCP for an actual physical and to have the paperwork filled out. Pt verbalized understanding

## 2018-08-20 ENCOUNTER — OFFICE VISIT (OUTPATIENT)
Dept: OBSTETRICS AND GYNECOLOGY | Facility: CLINIC | Age: 33
End: 2018-08-20
Payer: MEDICAID

## 2018-08-20 VITALS
SYSTOLIC BLOOD PRESSURE: 100 MMHG | BODY MASS INDEX: 29.73 KG/M2 | WEIGHT: 147.5 LBS | HEIGHT: 59 IN | DIASTOLIC BLOOD PRESSURE: 70 MMHG

## 2018-08-20 DIAGNOSIS — Z12.4 ROUTINE CERVICAL SMEAR: ICD-10-CM

## 2018-08-20 DIAGNOSIS — Z01.419 WELL WOMAN EXAM WITH ROUTINE GYNECOLOGICAL EXAM: Primary | ICD-10-CM

## 2018-08-20 PROCEDURE — 99213 OFFICE O/P EST LOW 20 MIN: CPT | Mod: PBBFAC,PO | Performed by: OBSTETRICS & GYNECOLOGY

## 2018-08-20 PROCEDURE — 88175 CYTOPATH C/V AUTO FLUID REDO: CPT

## 2018-08-20 PROCEDURE — 99999 PR PBB SHADOW E&M-EST. PATIENT-LVL III: CPT | Mod: PBBFAC,,, | Performed by: OBSTETRICS & GYNECOLOGY

## 2018-08-20 PROCEDURE — 99395 PREV VISIT EST AGE 18-39: CPT | Mod: S$PBB,,, | Performed by: OBSTETRICS & GYNECOLOGY

## 2018-08-20 NOTE — PROGRESS NOTES
"OBSTETRIC HISTORY:   OB History      Para Term  AB Living    3 2 2   1 2    SAB TAB Ectopic Multiple Live Births      1     2         COMPREHENSIVE GYN HISTORY:  PAP History: Denies abnormal Paps.  Infection History: Reports STDs: Herpes. Denies PID.  Benign History: Denies uterine fibroids. Denies ovarian cysts. Denies endometriosis.   Cancer History: Denies cervical cancer. Denies uterine cancer or hyperplasia. Denies ovarian cancer. Denies vulvar cancer or pre-cancer. Denies vaginal cancer or pre-cancer. Denies breast cancer. Denies colon cancer.  Sexual Activity History:  reports that she currently engages in sexual activity. She reports using the following method of birth control/protection: Implant.   Menstrual History:  Every 28 days, flows for 3 days. Moderate flow.  Dysmenorrhea History: Denies dysmenorrhea.  Contraception: Nexplanon  Inserted 18       HPI:   33 y.o.  No LMP recorded (lmp unknown). Patient has had an implant.   Patient is  here for her annual gynecologic exam.  She has no complaints. She denies bladder, breast and bowel complaints.    ROS:  GENERAL: Denies weight gain or weight loss. Feeling well overall.   SKIN: Denies rash or lesions.   HEAD: Denies headache.   NODES: Denies enlarged lymph nodes.   CHEST: Denies shortness of breath.   ABDOMEN: No abdominal pain, constipation, diarrhea, nausea, vomiting or rectal bleeding.   URINARY: No frequency, dysuria, hematuria, or burning on urination.  REPRODUCTIVE: See HPI.   BREASTS: The patient denies pain, lumps, or nipple discharge.   HEMATOLOGIC: No easy bruisability.   MUSCULOSKELETAL: Denies joint pain or back pain.   NEUROLOGIC: Denies weakness.   PSYCHIATRIC: Denies depression, anxiety or mood swings.    PE:   /70 (BP Location: Left arm)   Ht 4' 11" (1.499 m)   Wt 66.9 kg (147 lb 7.8 oz)   LMP  (LMP Unknown)   BMI 29.79 kg/m²   APPEARANCE: Well nourished, well developed, in no acute distress.  NECK: Neck " symmetric without  thyromegaly.  NODES: No inguinal, cervical lymph node enlargement.  CHEST: Lungs clear to auscultation.  HEART: Regular rate and rhythm, no murmurs, rubs or gallops.  ABDOMEN: Soft. No tenderness or masses. No hernias.  BREASTS: Symmetrical, no skin changes or visible lesions. No palpable masses, nipple discharge or adenopathy bilaterally.  PELVIC:   VULVA: No lesions. Normal female genitalia.  URETHRAL MEATUS: Normal size and location, no lesions, no prolapse.  URETHRA: No masses, tenderness, prolapse or scarring.  VAGINA: Moist and well rugated, no discharge, no significant cystocele or rectocele.  CERVIX: No lesions and discharge.  UTERUS: Normal size, regular shape, mobile, non-tender, bladder base nontender.  ADNEXA: No masses or tenderness.    PROCEDURES:  Pap smear    Assessment:  Normal Gynecologic Exam    Plan:  Mammogram and Colonoscopy if indicated by current recommendations.  Return to clinic in one year or for any problems or complaints.    Counseling:  Patient was counseled today on A.C.S. Pap guidelines and recommendations for yearly pelvic exams and monthly self breast exams; to see her PCP for other health maintenance. Regular exercise and healthy diet.

## 2019-06-07 ENCOUNTER — OCCUPATIONAL HEALTH (OUTPATIENT)
Dept: URGENT CARE | Facility: CLINIC | Age: 34
End: 2019-06-07

## 2019-06-07 DIAGNOSIS — Z02.1 ENCOUNTER FOR PRE-EMPLOYMENT HEALTH SCREENING EXAMINATION: Primary | ICD-10-CM

## 2019-06-07 DIAGNOSIS — Z02.83 ENCOUNTER FOR DRUG SCREENING: Primary | ICD-10-CM

## 2019-06-07 PROCEDURE — 99499 UNLISTED E&M SERVICE: CPT | Mod: S$GLB,,, | Performed by: NURSE PRACTITIONER

## 2019-06-07 PROCEDURE — 86580 POCT TB SKIN TEST: ICD-10-PCS | Mod: S$GLB,,, | Performed by: NURSE PRACTITIONER

## 2019-06-07 PROCEDURE — 99199 UNLISTED SPECIAL SVC PX/RPRT: CPT | Mod: S$GLB,,, | Performed by: PREVENTIVE MEDICINE

## 2019-06-07 PROCEDURE — 80305 OOH COLLECTION ONLY DRUG SCREEN: ICD-10-PCS | Mod: S$GLB,,, | Performed by: NURSE PRACTITIONER

## 2019-06-07 PROCEDURE — 80305 DRUG TEST PRSMV DIR OPT OBS: CPT | Mod: S$GLB,,, | Performed by: NURSE PRACTITIONER

## 2019-06-07 PROCEDURE — 99199 OCC MED MRO FEE: ICD-10-PCS | Mod: S$GLB,,, | Performed by: PREVENTIVE MEDICINE

## 2019-06-07 PROCEDURE — 86580 TB INTRADERMAL TEST: CPT | Mod: S$GLB,,, | Performed by: NURSE PRACTITIONER

## 2019-06-07 PROCEDURE — 99499 PHYSICAL, BASIC COMPLEXITY: ICD-10-PCS | Mod: S$GLB,,, | Performed by: NURSE PRACTITIONER

## 2019-06-09 LAB
TB INDURATION - 48 HR READ: 0 MM
TB INDURATION - 72 HR READ: NORMAL MM
TB SKIN TEST - 48 HR READ: NEGATIVE
TB SKIN TEST - 72 HR READ: NORMAL

## 2019-12-05 ENCOUNTER — TELEPHONE (OUTPATIENT)
Dept: OBSTETRICS AND GYNECOLOGY | Facility: CLINIC | Age: 34
End: 2019-12-05

## 2019-12-05 ENCOUNTER — OFFICE VISIT (OUTPATIENT)
Dept: OBSTETRICS AND GYNECOLOGY | Facility: CLINIC | Age: 34
End: 2019-12-05
Payer: MEDICAID

## 2019-12-05 VITALS
SYSTOLIC BLOOD PRESSURE: 110 MMHG | BODY MASS INDEX: 32.71 KG/M2 | DIASTOLIC BLOOD PRESSURE: 60 MMHG | WEIGHT: 162.25 LBS | HEIGHT: 59 IN

## 2019-12-05 DIAGNOSIS — Z11.3 SCREEN FOR STD (SEXUALLY TRANSMITTED DISEASE): ICD-10-CM

## 2019-12-05 DIAGNOSIS — N89.8 VAGINAL DISCHARGE: ICD-10-CM

## 2019-12-05 DIAGNOSIS — Z00.00 LABORATORY EXAMINATION ORDERED AS PART OF A ROUTINE GENERAL MEDICAL EXAMINATION: ICD-10-CM

## 2019-12-05 DIAGNOSIS — Z01.419 WELL WOMAN EXAM WITH ROUTINE GYNECOLOGICAL EXAM: Primary | ICD-10-CM

## 2019-12-05 DIAGNOSIS — Z12.4 SCREENING FOR CERVICAL CANCER: ICD-10-CM

## 2019-12-05 PROCEDURE — 99999 PR PBB SHADOW E&M-EST. PATIENT-LVL III: ICD-10-PCS | Mod: PBBFAC,,, | Performed by: OBSTETRICS & GYNECOLOGY

## 2019-12-05 PROCEDURE — 99395 PREV VISIT EST AGE 18-39: CPT | Mod: S$PBB,,, | Performed by: OBSTETRICS & GYNECOLOGY

## 2019-12-05 PROCEDURE — 87801 DETECT AGNT MULT DNA AMPLI: CPT

## 2019-12-05 PROCEDURE — 87481 CANDIDA DNA AMP PROBE: CPT | Mod: 59

## 2019-12-05 PROCEDURE — 87624 HPV HI-RISK TYP POOLED RSLT: CPT

## 2019-12-05 PROCEDURE — 99213 OFFICE O/P EST LOW 20 MIN: CPT | Mod: PBBFAC,PO | Performed by: OBSTETRICS & GYNECOLOGY

## 2019-12-05 PROCEDURE — 99395 PR PREVENTIVE VISIT,EST,18-39: ICD-10-PCS | Mod: S$PBB,,, | Performed by: OBSTETRICS & GYNECOLOGY

## 2019-12-05 PROCEDURE — 88175 CYTOPATH C/V AUTO FLUID REDO: CPT

## 2019-12-05 PROCEDURE — 99999 PR PBB SHADOW E&M-EST. PATIENT-LVL III: CPT | Mod: PBBFAC,,, | Performed by: OBSTETRICS & GYNECOLOGY

## 2019-12-05 PROCEDURE — 87491 CHLMYD TRACH DNA AMP PROBE: CPT

## 2019-12-05 RX ORDER — SPIRONOLACTONE 100 MG/1
100 TABLET, FILM COATED ORAL DAILY
Qty: 30 TABLET | Refills: 11 | Status: SHIPPED | OUTPATIENT
Start: 2019-12-05 | End: 2021-05-19

## 2019-12-05 NOTE — PROGRESS NOTES
OBSTETRIC HISTORY:   OB History        3    Para   2    Term   2            AB   1    Living   2       SAB        TAB   1    Ectopic        Multiple        Live Births   2                COMPREHENSIVE GYN HISTORY:  PAP History: Denies abnormal Paps.  Infection History: Reports STDs: Herpes. Denies PID.  Benign History: Denies uterine fibroids. Denies ovarian cysts. Denies endometriosis.   Cancer History: Denies cervical cancer. Denies uterine cancer or hyperplasia. Denies ovarian cancer. Denies vulvar cancer or pre-cancer. Denies vaginal cancer or pre-cancer. Denies breast cancer. Denies colon cancer.  Sexual Activity History:  reports that she currently engages in sexual activity. She reports using the following method of birth control/protection: Implant.   Menstrual History:  Every 28 days, flows for 3 days. Moderate flow.  Dysmenorrhea History: Denies dysmenorrhea.  Contraception: Nexplanon  Inserted 18          HPI:   34 y.o.  No LMP recorded. Patient has had an implant.   Patient is  here for her annual gynecologic exam.  She has no complaints but wants STD testing and routine labs. Asking about PCOS and facial hair along with acne. Someone mentioned taking Aldactone and Metformin. Notified without blood work can't do Metformin but will order Aldactone. She denies bladder, breast and bowel complaints.    ROS:  GENERAL: Denies weight gain or weight loss. Feeling well overall.   SKIN: Denies rash or lesions.   HEAD: Denies headache.   NODES: Denies enlarged lymph nodes.   CHEST: Denies shortness of breath.   ABDOMEN: No abdominal pain, constipation, diarrhea, nausea, vomiting or rectal bleeding.   URINARY: No frequency, dysuria, hematuria, or burning on urination.  REPRODUCTIVE: See HPI.   BREASTS: The patient denies pain, lumps, or nipple discharge.   HEMATOLOGIC: No easy bruisability.   MUSCULOSKELETAL: Denies joint pain or back pain.   NEUROLOGIC: Denies weakness.   PSYCHIATRIC: Denies  "depression, anxiety or mood swings.    PE:   /60   Ht 4' 11" (1.499 m)   Wt 73.6 kg (162 lb 4.1 oz)   BMI 32.77 kg/m²   APPEARANCE: Well nourished, well developed, in no acute distress.  NECK: Neck symmetric without  thyromegaly.  NODES: No inguinal, cervical lymph node enlargement.  CHEST: Lungs clear to auscultation.  HEART: Regular rate and rhythm, no murmurs, rubs or gallops.  ABDOMEN: Soft. No tenderness or masses. No hernias.  BREASTS: Symmetrical, no skin changes or visible lesions. No palpable masses, nipple discharge or adenopathy bilaterally.  PELVIC:   VULVA: No lesions. Normal female genitalia.  URETHRAL MEATUS: Normal size and location, no lesions, no prolapse.  URETHRA: No masses, tenderness, prolapse or scarring.  VAGINA: Moist and well rugated, no discharge, no significant cystocele or rectocele.  CERVIX: No lesions and discharge.  UTERUS: Normal size, regular shape, mobile, non-tender, bladder base nontender.  ADNEXA: No masses or tenderness.    PROCEDURES:  Pap smear  HRHPV  STD testing-- Affirm, GC/CT, HIV, RPR, Hepatitis B and C   Lipid, CBC, TSH, CMP, vitamin D    Assessment:  Normal Gynecologic Exam    Plan:  Mammogram and Colonoscopy if indicated by current recommendations.  Return to clinic in one year or for any problems or complaints.    Counseling:  Patient was counseled today on A.C.S. Pap guidelines and recommendations for yearly pelvic exams and monthly self breast exams; to see her PCP for other health maintenance. Regular exercise and healthy diet.          "

## 2019-12-05 NOTE — TELEPHONE ENCOUNTER
----- Message from Andreina Duque sent at 12/5/2019  8:05 AM CST -----  Contact: [phone]/self  Patient would like to speak you concerning getting sooner time. Please call.

## 2019-12-07 LAB
C TRACH DNA SPEC QL NAA+PROBE: NOT DETECTED
N GONORRHOEA DNA SPEC QL NAA+PROBE: NOT DETECTED

## 2019-12-09 LAB
BACTERIAL VAGINOSIS DNA: POSITIVE
CANDIDA GLABRATA DNA: NEGATIVE
CANDIDA KRUSEI DNA: NEGATIVE
CANDIDA RRNA VAG QL PROBE: NEGATIVE
T VAGINALIS RRNA GENITAL QL PROBE: NEGATIVE

## 2019-12-09 RX ORDER — METRONIDAZOLE 500 MG/1
500 TABLET ORAL EVERY 12 HOURS
Qty: 14 TABLET | Refills: 0 | Status: SHIPPED | OUTPATIENT
Start: 2019-12-09 | End: 2019-12-16

## 2019-12-09 NOTE — TELEPHONE ENCOUNTER
----- Message from Rosalia Gould sent at 12/9/2019  1:05 PM CST -----  Contact: [phone]-self  Patient is returning your call.

## 2019-12-09 NOTE — TELEPHONE ENCOUNTER
Patient notified and verbalized understanding.  Patient states she would prefer the oral.     Flagyl 500mg #14 one po BID no ETOH while taking medication R-0 pended to go to [facility] [address].

## 2019-12-10 LAB
HPV HR 12 DNA SPEC QL NAA+PROBE: POSITIVE
HPV16 AG SPEC QL: NEGATIVE
HPV18 DNA SPEC QL NAA+PROBE: NEGATIVE

## 2019-12-11 ENCOUNTER — LAB VISIT (OUTPATIENT)
Dept: LAB | Facility: HOSPITAL | Age: 34
End: 2019-12-11
Payer: MEDICAID

## 2019-12-11 DIAGNOSIS — Z00.00 LABORATORY EXAMINATION ORDERED AS PART OF A ROUTINE GENERAL MEDICAL EXAMINATION: ICD-10-CM

## 2019-12-11 DIAGNOSIS — Z11.3 SCREEN FOR STD (SEXUALLY TRANSMITTED DISEASE): ICD-10-CM

## 2019-12-11 LAB
25(OH)D3+25(OH)D2 SERPL-MCNC: 25 NG/ML (ref 30–96)
ALBUMIN SERPL BCP-MCNC: 4 G/DL (ref 3.5–5.2)
ALP SERPL-CCNC: 63 U/L (ref 55–135)
ALT SERPL W/O P-5'-P-CCNC: 25 U/L (ref 10–44)
ANION GAP SERPL CALC-SCNC: 10 MMOL/L (ref 8–16)
AST SERPL-CCNC: 20 U/L (ref 10–40)
BASOPHILS # BLD AUTO: 0.07 K/UL (ref 0–0.2)
BASOPHILS NFR BLD: 0.9 % (ref 0–1.9)
BILIRUB SERPL-MCNC: 0.4 MG/DL (ref 0.1–1)
BUN SERPL-MCNC: 15 MG/DL (ref 6–20)
CALCIUM SERPL-MCNC: 9.6 MG/DL (ref 8.7–10.5)
CHLORIDE SERPL-SCNC: 106 MMOL/L (ref 95–110)
CHOLEST SERPL-MCNC: 205 MG/DL (ref 120–199)
CHOLEST/HDLC SERPL: 4 {RATIO} (ref 2–5)
CO2 SERPL-SCNC: 23 MMOL/L (ref 23–29)
CREAT SERPL-MCNC: 0.8 MG/DL (ref 0.5–1.4)
DIFFERENTIAL METHOD: NORMAL
EOSINOPHIL # BLD AUTO: 0.2 K/UL (ref 0–0.5)
EOSINOPHIL NFR BLD: 2.7 % (ref 0–8)
ERYTHROCYTE [DISTWIDTH] IN BLOOD BY AUTOMATED COUNT: 12.1 % (ref 11.5–14.5)
EST. GFR  (AFRICAN AMERICAN): >60 ML/MIN/1.73 M^2
EST. GFR  (NON AFRICAN AMERICAN): >60 ML/MIN/1.73 M^2
GLUCOSE SERPL-MCNC: 92 MG/DL (ref 70–110)
HBV SURFACE AG SERPL QL IA: NEGATIVE
HCT VFR BLD AUTO: 42.6 % (ref 37–48.5)
HCV AB SERPL QL IA: NEGATIVE
HDLC SERPL-MCNC: 51 MG/DL (ref 40–75)
HDLC SERPL: 24.9 % (ref 20–50)
HGB BLD-MCNC: 14 G/DL (ref 12–16)
HIV 1+2 AB+HIV1 P24 AG SERPL QL IA: NEGATIVE
IMM GRANULOCYTES # BLD AUTO: 0.04 K/UL (ref 0–0.04)
IMM GRANULOCYTES NFR BLD AUTO: 0.5 % (ref 0–0.5)
LDLC SERPL CALC-MCNC: 128.6 MG/DL (ref 63–159)
LYMPHOCYTES # BLD AUTO: 1.9 K/UL (ref 1–4.8)
LYMPHOCYTES NFR BLD: 23.8 % (ref 18–48)
MCH RBC QN AUTO: 28.7 PG (ref 27–31)
MCHC RBC AUTO-ENTMCNC: 32.9 G/DL (ref 32–36)
MCV RBC AUTO: 87 FL (ref 82–98)
MONOCYTES # BLD AUTO: 0.4 K/UL (ref 0.3–1)
MONOCYTES NFR BLD: 4.6 % (ref 4–15)
NEUTROPHILS # BLD AUTO: 5.3 K/UL (ref 1.8–7.7)
NEUTROPHILS NFR BLD: 67.5 % (ref 38–73)
NONHDLC SERPL-MCNC: 154 MG/DL
NRBC BLD-RTO: 0 /100 WBC
PLATELET # BLD AUTO: 298 K/UL (ref 150–350)
PMV BLD AUTO: 9.5 FL (ref 9.2–12.9)
POTASSIUM SERPL-SCNC: 4.2 MMOL/L (ref 3.5–5.1)
PROT SERPL-MCNC: 7.7 G/DL (ref 6–8.4)
RBC # BLD AUTO: 4.88 M/UL (ref 4–5.4)
SODIUM SERPL-SCNC: 139 MMOL/L (ref 136–145)
TRIGL SERPL-MCNC: 127 MG/DL (ref 30–150)
TSH SERPL DL<=0.005 MIU/L-ACNC: 1.84 UIU/ML (ref 0.4–4)
WBC # BLD AUTO: 7.8 K/UL (ref 3.9–12.7)

## 2019-12-11 PROCEDURE — 82306 VITAMIN D 25 HYDROXY: CPT

## 2019-12-11 PROCEDURE — 86592 SYPHILIS TEST NON-TREP QUAL: CPT

## 2019-12-11 PROCEDURE — 86703 HIV-1/HIV-2 1 RESULT ANTBDY: CPT

## 2019-12-11 PROCEDURE — 80061 LIPID PANEL: CPT

## 2019-12-11 PROCEDURE — 85025 COMPLETE CBC W/AUTO DIFF WBC: CPT

## 2019-12-11 PROCEDURE — 87340 HEPATITIS B SURFACE AG IA: CPT

## 2019-12-11 PROCEDURE — 84443 ASSAY THYROID STIM HORMONE: CPT

## 2019-12-11 PROCEDURE — 36415 COLL VENOUS BLD VENIPUNCTURE: CPT

## 2019-12-11 PROCEDURE — 86803 HEPATITIS C AB TEST: CPT

## 2019-12-11 PROCEDURE — 80053 COMPREHEN METABOLIC PANEL: CPT

## 2019-12-12 ENCOUNTER — TELEPHONE (OUTPATIENT)
Dept: OBSTETRICS AND GYNECOLOGY | Facility: CLINIC | Age: 34
End: 2019-12-12

## 2019-12-12 LAB — RPR SER QL: NORMAL

## 2019-12-12 NOTE — TELEPHONE ENCOUNTER
Patient notified labs were normal except low on vitamin D. She needs to take vitamin D 2000 david daily. Patient verbalized understanding.

## 2019-12-12 NOTE — TELEPHONE ENCOUNTER
Notify all labs are normal except has a low vitamin D level. She should start taking OTC vitamin D 2000 Iu daily

## 2019-12-22 LAB
FINAL PATHOLOGIC DIAGNOSIS: NORMAL
Lab: NORMAL

## 2019-12-23 ENCOUNTER — TELEPHONE (OUTPATIENT)
Dept: OBSTETRICS AND GYNECOLOGY | Facility: CLINIC | Age: 34
End: 2019-12-23

## 2019-12-23 NOTE — TELEPHONE ENCOUNTER
TR  Pap was normal with positive HRHPV (not 16/18) this requires repeat Pap smear and HRHPV next year.  She does not need a biopsy this year.  Left message that I would not be back in the office until 12/30 but did leave a detailed message.

## 2020-02-06 ENCOUNTER — OFFICE VISIT (OUTPATIENT)
Dept: OBSTETRICS AND GYNECOLOGY | Facility: CLINIC | Age: 35
End: 2020-02-06
Payer: MEDICAID

## 2020-02-06 VITALS
BODY MASS INDEX: 31.98 KG/M2 | HEIGHT: 59 IN | DIASTOLIC BLOOD PRESSURE: 64 MMHG | WEIGHT: 158.63 LBS | SYSTOLIC BLOOD PRESSURE: 110 MMHG

## 2020-02-06 DIAGNOSIS — F43.21 SITUATIONAL DEPRESSION: Primary | ICD-10-CM

## 2020-02-06 PROCEDURE — 99213 OFFICE O/P EST LOW 20 MIN: CPT | Mod: PBBFAC,PO | Performed by: OBSTETRICS & GYNECOLOGY

## 2020-02-06 PROCEDURE — 99999 PR PBB SHADOW E&M-EST. PATIENT-LVL III: CPT | Mod: PBBFAC,,, | Performed by: OBSTETRICS & GYNECOLOGY

## 2020-02-06 PROCEDURE — 99999 PR PBB SHADOW E&M-EST. PATIENT-LVL III: ICD-10-PCS | Mod: PBBFAC,,, | Performed by: OBSTETRICS & GYNECOLOGY

## 2020-02-06 PROCEDURE — 99211 PR OFFICE/OUTPT VISIT, EST, LEVL I: ICD-10-PCS | Mod: S$PBB,,, | Performed by: OBSTETRICS & GYNECOLOGY

## 2020-02-06 PROCEDURE — 99211 OFF/OP EST MAY X REQ PHY/QHP: CPT | Mod: S$PBB,,, | Performed by: OBSTETRICS & GYNECOLOGY

## 2020-02-06 RX ORDER — FLUOXETINE 10 MG/1
10 CAPSULE ORAL DAILY
Qty: 30 CAPSULE | Refills: 2 | Status: SHIPPED | OUTPATIENT
Start: 2020-02-06 | End: 2020-04-28 | Stop reason: SDUPTHER

## 2020-02-06 NOTE — PROGRESS NOTES
OBSTETRIC HISTORY:   OB History        3    Para   2    Term   2            AB   1    Living   2       SAB        TAB   1    Ectopic        Multiple        Live Births   2                COMPREHENSIVE GYN HISTORY:  PAP History: Denies abnormal Paps.  Infection History: Reports STDs: Herpes. Denies PID.  Benign History: Denies uterine fibroids. Denies ovarian cysts. Denies endometriosis.   Cancer History: Denies cervical cancer. Denies uterine cancer or hyperplasia. Denies ovarian cancer. Denies vulvar cancer or pre-cancer. Denies vaginal cancer or pre-cancer. Denies breast cancer. Denies colon cancer.  Sexual Activity History:  reports that she currently engages in sexual activity. She reports using the following method of birth control/protection: Implant.   Menstrual History:  Every 28 days, flows for 3 days. Moderate flow.  Dysmenorrhea History: Denies dysmenorrhea.  Contraception: Nexplanon  Inserted 18          HPI:   34 y.o.  No LMP recorded. Patient has had an implant.   Patient is  here complaining of crying all the time. Still in love with FOB and he lives close by so she always sees him, Aunt just got diagnsoed with bi-polar disorder and one child is having difficulty in school. She has taken Wellbutrin in the past and maybe wants to try Prozac. Discussed talking to a  or preacher but states even though it is supposed to confidential that it won't be. No SI/HI. She denies bladder, breast and bowel complaints.    ROS:  GENERAL: Denies weight gain or weight loss. Feeling well overall.   SKIN: Denies rash or lesions.   HEAD: Denies headache.   NODES: Denies enlarged lymph nodes.   CHEST: Denies shortness of breath.   ABDOMEN: No abdominal pain, constipation, diarrhea, nausea, vomiting or rectal bleeding.   URINARY: No frequency, dysuria, hematuria, or burning on urination.  REPRODUCTIVE: See HPI.   BREASTS: The patient denies pain, lumps, or nipple discharge.   HEMATOLOGIC: No easy  "bruisability.   MUSCULOSKELETAL: Denies joint pain or back pain.   NEUROLOGIC: Denies weakness.   PSYCHIATRIC: + mood changes.    PE:   /64   Ht 4' 11" (1.499 m)   Wt 71.9 kg (158 lb 9.6 oz)   BMI 32.03 kg/m²   APPEARANCE: Well nourished, well developed, in no acute distress.  Talk only for 6 minutes    ASSESSMENT:  Situational mood changes/depression    PLAN:  RTO 5 weeks to recheck Prozac      "

## 2020-02-21 RX ORDER — VALACYCLOVIR HYDROCHLORIDE 500 MG/1
500 TABLET, FILM COATED ORAL 2 TIMES DAILY
Qty: 6 TABLET | Refills: 2 | Status: SHIPPED | OUTPATIENT
Start: 2020-02-21 | End: 2020-07-02 | Stop reason: SDUPTHER

## 2020-02-21 NOTE — TELEPHONE ENCOUNTER
----- Message from Chio العلي sent at 2/21/2020  2:48 PM CST -----  Contact: PT   PT is requesting a call back to in regards to a prescription for a breakout , she can be reached at  [phone] thanks

## 2020-02-21 NOTE — TELEPHONE ENCOUNTER
Pap WNL, HR HPV positive for other (not 16 or 18) at well woman exam on 12/5/2019.  Patient is requesting a refill of Valtrex 500mg to be sent to the [facility] on [address]

## 2020-03-02 ENCOUNTER — TELEPHONE (OUTPATIENT)
Dept: OBSTETRICS AND GYNECOLOGY | Facility: CLINIC | Age: 35
End: 2020-03-02

## 2020-03-02 NOTE — TELEPHONE ENCOUNTER
----- Message from Fabio Wilson sent at 3/2/2020  3:39 PM CST -----  Contact: self  Pt called to get refill on medication pt stated she forgot the name but the nurse will know.    [phone] pt callback number    Jana Kraft notified.

## 2020-03-03 NOTE — TELEPHONE ENCOUNTER
----- Message from Rosalia Gould sent at 3/3/2020 10:38 AM CST -----  Contact: [phone]-self  Refill request for:  valACYclovir (VALTREX) 500 MG tablet    Be sent to:  [id number] - [place] - [address]  AT [facility] [address]

## 2020-03-03 NOTE — TELEPHONE ENCOUNTER
Patient notified additional refills for the Valtrex was sent on 2/21/20.  Patient verbalized understanding

## 2020-03-18 RX ORDER — METRONIDAZOLE 500 MG/1
500 TABLET ORAL EVERY 12 HOURS
Qty: 14 TABLET | Refills: 0 | Status: SHIPPED | OUTPATIENT
Start: 2020-03-18 | End: 2020-03-25

## 2020-03-18 RX ORDER — TERCONAZOLE 4 MG/G
1 CREAM VAGINAL NIGHTLY
Qty: 45 G | Refills: 0 | Status: SHIPPED | OUTPATIENT
Start: 2020-03-18 | End: 2020-08-06 | Stop reason: ALTCHOICE

## 2020-03-18 RX ORDER — VALACYCLOVIR HYDROCHLORIDE 500 MG/1
500 TABLET, FILM COATED ORAL DAILY
Qty: 30 TABLET | Refills: 8 | Status: SHIPPED | OUTPATIENT
Start: 2020-03-18 | End: 2020-04-17

## 2020-03-18 NOTE — TELEPHONE ENCOUNTER
At this point sounds like she may be having it more than 4 x a year and that would suggest she should do suppressive to take one tablet daily. If she wants to do that I can send an Rx to take daily.

## 2020-03-18 NOTE — TELEPHONE ENCOUNTER
"Patient notified and verbalized understanding; but is now stating she has a vaginal discharge that looks green and has an odor "not fishy, but not normal".      Patient notified per Dr. Wang will continue with plan for suppressive treatment for HSV but will also send prescription for yeast and BV.  Patient verbalized understanding  "

## 2020-03-18 NOTE — TELEPHONE ENCOUNTER
----- Message from Celestino Ryder sent at 3/17/2020  5:31 PM CDT -----  Contact: [phone]/ self   Patient requesting to speak with you regarding getting medication prescribed for anal discomfort. Please call.

## 2020-03-18 NOTE — TELEPHONE ENCOUNTER
----- Message from Andreina Dquue sent at 3/18/2020 10:29 AM CDT -----  Contact: [phone]/self  Patient called in returning your call. Please advise.

## 2020-03-18 NOTE — TELEPHONE ENCOUNTER
Patient is complaining of pain/burning in her rectal area where she has always had her HSV outbreaks.  Patient states she has gone through all of her refills of Valtrex.  An episode occurs, she'll take the medication, it seems to get better but then a few days later it starts over.  Patient states it is painful to urinate and to have a BM.     Please advise

## 2020-04-28 ENCOUNTER — TELEPHONE (OUTPATIENT)
Dept: OBSTETRICS AND GYNECOLOGY | Facility: CLINIC | Age: 35
End: 2020-04-28

## 2020-04-28 RX ORDER — FLUOXETINE 10 MG/1
10 CAPSULE ORAL DAILY
Qty: 30 CAPSULE | Refills: 2 | Status: SHIPPED | OUTPATIENT
Start: 2020-04-28 | End: 2020-09-03

## 2020-04-28 NOTE — TELEPHONE ENCOUNTER
Pt last seen 02/06/20 and was supposed to follow up in 5 weeks. She had a script sent in with 2 refills. Please advise

## 2020-04-28 NOTE — TELEPHONE ENCOUNTER
----- Message from Cristal Benavides sent at 4/28/2020  8:03 AM CDT -----  Contact: Pt  Type:  RX Refill Request    Who Called: Deirdre Rodriguez  Refill or New Rx: refill  RX Name and Strength: FLUoxetine 10 MG capsule  How is the patient currently taking it? (ex. 1XDay): 1 per day  Is this a 30 day or 90 day RX: 8 months  Preferred Pharmacy with phone number: [id number] [place] [facility] [address]  AT [facility] [address] [phone] (Phone)  Local or Mail Order: Local  Ordering Provider:Fanta Wang  Would the patient rather a call back or a response via [facility]? Callback   Best Call Back Number [phone]  Additional Information: Pt is requesting a refill on medication

## 2020-07-02 ENCOUNTER — TELEPHONE (OUTPATIENT)
Dept: OBSTETRICS AND GYNECOLOGY | Facility: CLINIC | Age: 35
End: 2020-07-02

## 2020-07-02 RX ORDER — VALACYCLOVIR HYDROCHLORIDE 500 MG/1
500 TABLET, FILM COATED ORAL 2 TIMES DAILY
Qty: 6 TABLET | Refills: 2 | Status: SHIPPED | OUTPATIENT
Start: 2020-07-02 | End: 2021-10-13

## 2020-07-02 NOTE — TELEPHONE ENCOUNTER
Patient states that she would like a refill for her Valtrex 500mg has an appointment August 6th.2020

## 2020-07-02 NOTE — TELEPHONE ENCOUNTER
----- Message from Rosalia Gould sent at 7/2/2020  1:57 PM CDT -----  Contact: [phone]  Type:  Patient Returning Call    Who Called:PT  Who Left Message for Patient: Fouzia  Does the patient know what this is regarding?: Refill  Would the patient rather a call back or a response via [facility]? Call Back  Best Call Back Number:  Additional Information: n/a

## 2020-07-02 NOTE — TELEPHONE ENCOUNTER
----- Message from Desirae Barth sent at 7/2/2020  7:48 AM CDT -----  Regarding: advice  Type:  Needs Medical Advice    Who Called: pt  Advice Regarding: pt went to ER yesterday and wants to know if you received the summary  Would the patient rather a call back or a response via [facility]? call  Best Call Back Number: [phone]  Additional Information: n/a

## 2020-08-06 ENCOUNTER — OFFICE VISIT (OUTPATIENT)
Dept: OBSTETRICS AND GYNECOLOGY | Facility: CLINIC | Age: 35
End: 2020-08-06
Payer: MEDICAID

## 2020-08-06 VITALS
BODY MASS INDEX: 31.89 KG/M2 | SYSTOLIC BLOOD PRESSURE: 110 MMHG | WEIGHT: 158.19 LBS | DIASTOLIC BLOOD PRESSURE: 64 MMHG | HEIGHT: 59 IN

## 2020-08-06 DIAGNOSIS — F43.21 SITUATIONAL DEPRESSION: Primary | ICD-10-CM

## 2020-08-06 PROCEDURE — 99999 PR PBB SHADOW E&M-EST. PATIENT-LVL III: ICD-10-PCS | Mod: PBBFAC,,, | Performed by: OBSTETRICS & GYNECOLOGY

## 2020-08-06 PROCEDURE — 99212 PR OFFICE/OUTPT VISIT, EST, LEVL II, 10-19 MIN: ICD-10-PCS | Mod: S$PBB,,, | Performed by: OBSTETRICS & GYNECOLOGY

## 2020-08-06 PROCEDURE — 99999 PR PBB SHADOW E&M-EST. PATIENT-LVL III: CPT | Mod: PBBFAC,,, | Performed by: OBSTETRICS & GYNECOLOGY

## 2020-08-06 PROCEDURE — 99212 OFFICE O/P EST SF 10 MIN: CPT | Mod: S$PBB,,, | Performed by: OBSTETRICS & GYNECOLOGY

## 2020-08-06 PROCEDURE — 99213 OFFICE O/P EST LOW 20 MIN: CPT | Mod: PBBFAC,PO | Performed by: OBSTETRICS & GYNECOLOGY

## 2020-08-06 RX ORDER — VALACYCLOVIR HYDROCHLORIDE 500 MG/1
500 TABLET, FILM COATED ORAL DAILY
Qty: 30 TABLET | Refills: 4 | Status: SHIPPED | OUTPATIENT
Start: 2020-08-06 | End: 2021-04-01 | Stop reason: SDUPTHER

## 2020-08-06 NOTE — PROGRESS NOTES
OBSTETRIC HISTORY:   OB History        3    Para   2    Term   2            AB   1    Living   2       SAB        TAB   1    Ectopic        Multiple        Live Births   2                COMPREHENSIVE GYN HISTORY:  PAP History: Denies abnormal Paps.  Infection History: Reports STDs: Herpes. Denies PID.  Benign History: Denies uterine fibroids. Denies ovarian cysts. Denies endometriosis.   Cancer History: Denies cervical cancer. Denies uterine cancer or hyperplasia. Denies ovarian cancer. Denies vulvar cancer or pre-cancer. Denies vaginal cancer or pre-cancer. Denies breast cancer. Denies colon cancer.  Sexual Activity History:  reports that she currently engages in sexual activity. She reports using the following method of birth control/protection: Implant.   Menstrual History:  Every 28 days, flows for 3 days. Moderate flow.  Dysmenorrhea History: Denies dysmenorrhea.  Contraception: Nexplanon  Inserted 18          HPI:   35 y.o.  No LMP recorded. Patient has had an implant.   Patient is  here follow up of Prozac. She stopped it for 1 week without tapering and was having emotional swings. Prior to that she was doing well. She wants to increase the strength. She restarted it 5 days ago. We discussed she should never just stop this medicine as it causes a rebound effect. We will do this dose for 5 more weeks then she will contact me 9/10/20 and let me know if we need to increase to Prozac 20mg. She needs a refill of Valtrex    ROS:  GENERAL: Denies weight gain or weight loss. Feeling well overall.   SKIN: Denies rash or lesions.   HEAD: Denies headache.   NODES: Denies enlarged lymph nodes.   CHEST: Denies shortness of breath.   ABDOMEN: No abdominal pain, constipation, diarrhea, nausea, vomiting or rectal bleeding.   URINARY: No frequency, dysuria, hematuria, or burning on urination.  REPRODUCTIVE: See HPI.   BREASTS: The patient denies pain, lumps, or nipple discharge.   HEMATOLOGIC: No easy  "bruisability.   MUSCULOSKELETAL: Denies joint pain or back pain.   NEUROLOGIC: Denies weakness.   PSYCHIATRIC: Denies depression, anxiety or mood swings.    PE:   /64   Ht 4' 11" (1.499 m)   Wt 71.7 kg (158 lb 2.9 oz)   BMI 31.95 kg/m²   APPEARANCE: Well nourished, well developed, in no acute distress.  Talk only as above for 14 minutes.      ASSESSMENT:  Situational anxiety/depression    PLAN:  RTO 12/2020        "

## 2020-09-10 RX ORDER — FLUOXETINE 10 MG/1
10 CAPSULE ORAL 2 TIMES DAILY
Qty: 60 CAPSULE | Refills: 2 | Status: SHIPPED | OUTPATIENT
Start: 2020-09-10 | End: 2021-03-15

## 2020-09-10 NOTE — TELEPHONE ENCOUNTER
"Patient is requesting to increase her Fluoxetine?   Patient states she is ok until about 5pm then she starts "stressing and freaking out".  Patient states she takes it around 3pm every day but wants to know if could increase the dose and take it twice a day?      Please advise  "

## 2020-09-10 NOTE — TELEPHONE ENCOUNTER
Yes she is in Prozac 10mg and wants to do BID.    Patient notified new Rx will be sent electronically to [facility] on [address].  Patient verbalized understanding

## 2020-09-10 NOTE — TELEPHONE ENCOUNTER
So, just to clarify, start taking Prozac 10mg BID????  Because that is what I would recommend as the next step

## 2020-09-10 NOTE — TELEPHONE ENCOUNTER
----- Message from La Peñaloza sent at 9/10/2020  9:44 AM CDT -----  Contact: SELF [phone]  Patient would like to speak with you about needing an increase in her medication Please advise

## 2020-12-04 ENCOUNTER — TELEPHONE (OUTPATIENT)
Dept: OBSTETRICS AND GYNECOLOGY | Facility: CLINIC | Age: 35
End: 2020-12-04

## 2020-12-04 DIAGNOSIS — Z30.9 ENCOUNTER FOR CONTRACEPTIVE MANAGEMENT, UNSPECIFIED TYPE: Primary | ICD-10-CM

## 2020-12-04 NOTE — TELEPHONE ENCOUNTER
----- Message from Berta Atwood sent at 12/4/2020 12:04 PM CST -----  Contact: DEIRDRE LÓPEZ [[id number]]  Type:  Sooner Appointment Request    Caller is requesting a sooner appointment.   Name of Caller: Deirdre   When is the first available appointment? N/a   Symptoms: removal/insertion of nexplanon last done on 9/28/2017  Would the patient rather a call back or a response via [facility]?  Call back   Best Call Back Number: [phone]  Additional Information:  pt also wants to schedule WWE due on 2/6

## 2020-12-04 NOTE — TELEPHONE ENCOUNTER
Patient notified there is nothing available before the end of the year for Nexplanon replacement and annual.  Also notified needs to get up to date on her annual before we can do the replacement.  Patient verbalized understanding and scheduled for annual at next available time in January.  Patient states if there is anytime available before the end of the year she is able to come.

## 2020-12-04 NOTE — TELEPHONE ENCOUNTER
----- Message from Mariah Rosario sent at 12/4/2020  2:52 PM CST -----  Type:  Patient Returning Call    Who Called: Deirdre  Who Left Message for Patient: Linda  Does the patient know what this is regarding?: pap results  Would the patient rather a call back or a response via [facility]? Call back  Best Call Back Number: [phone]  Additional Information: none

## 2020-12-07 RX ORDER — DESOGESTREL AND ETHINYL ESTRADIOL 21-5 (28)
1 KIT ORAL DAILY
Qty: 28 TABLET | Refills: 2 | Status: SHIPPED | OUTPATIENT
Start: 2020-12-07 | End: 2021-04-07

## 2020-12-07 NOTE — TELEPHONE ENCOUNTER
Needs to keep annual in January.  Nexplanon ordered.  Does she want an OCP so she is covered since the Nexplanon is now past due to be removed (ie )

## 2020-12-07 NOTE — TELEPHONE ENCOUNTER
Patient notified and verbalized understanding.  States wants to go ahead and start an OCP.  Please send prescription to [facility] at [address].

## 2020-12-07 NOTE — TELEPHONE ENCOUNTER
----- Message from Lety Mejia sent at 12/7/2020  2:48 PM CST -----  Type:  Needs Medical Advice    Who Called: self  Reason:returning call  Would the patient rather a call back or a response via [facility]? call  Best Call Back Number: [phone]  Additional Information: none

## 2020-12-17 ENCOUNTER — TELEPHONE (OUTPATIENT)
Dept: OBSTETRICS AND GYNECOLOGY | Facility: CLINIC | Age: 35
End: 2020-12-17

## 2021-01-21 ENCOUNTER — TELEPHONE (OUTPATIENT)
Dept: OBSTETRICS AND GYNECOLOGY | Facility: CLINIC | Age: 36
End: 2021-01-21

## 2021-01-21 RX ORDER — CLOTRIMAZOLE AND BETAMETHASONE DIPROPIONATE 10; .64 MG/G; MG/G
CREAM TOPICAL
Qty: 15 G | Refills: 0 | Status: SHIPPED | OUTPATIENT
Start: 2021-01-21 | End: 2021-04-07

## 2021-01-22 RX ORDER — METRONIDAZOLE 500 MG/1
500 TABLET ORAL EVERY 12 HOURS
Qty: 14 TABLET | Refills: 0 | Status: SHIPPED | OUTPATIENT
Start: 2021-01-22 | End: 2021-01-29

## 2021-03-30 ENCOUNTER — TELEPHONE (OUTPATIENT)
Dept: OBSTETRICS AND GYNECOLOGY | Facility: CLINIC | Age: 36
End: 2021-03-30

## 2021-04-01 RX ORDER — VALACYCLOVIR HYDROCHLORIDE 500 MG/1
500 TABLET, FILM COATED ORAL DAILY
Qty: 30 TABLET | Refills: 0 | Status: SHIPPED | OUTPATIENT
Start: 2021-04-01 | End: 2021-04-07 | Stop reason: SDUPTHER

## 2021-04-07 ENCOUNTER — LAB VISIT (OUTPATIENT)
Dept: LAB | Facility: HOSPITAL | Age: 36
End: 2021-04-07
Attending: OBSTETRICS & GYNECOLOGY
Payer: MEDICAID

## 2021-04-07 ENCOUNTER — OFFICE VISIT (OUTPATIENT)
Dept: OBSTETRICS AND GYNECOLOGY | Facility: CLINIC | Age: 36
End: 2021-04-07
Payer: MEDICAID

## 2021-04-07 VITALS
SYSTOLIC BLOOD PRESSURE: 110 MMHG | HEIGHT: 59 IN | WEIGHT: 147 LBS | DIASTOLIC BLOOD PRESSURE: 60 MMHG | BODY MASS INDEX: 29.64 KG/M2

## 2021-04-07 DIAGNOSIS — Z12.4 ROUTINE CERVICAL SMEAR: ICD-10-CM

## 2021-04-07 DIAGNOSIS — Z11.3 SCREEN FOR STD (SEXUALLY TRANSMITTED DISEASE): ICD-10-CM

## 2021-04-07 DIAGNOSIS — Z01.419 WELL WOMAN EXAM WITH ROUTINE GYNECOLOGICAL EXAM: Primary | ICD-10-CM

## 2021-04-07 DIAGNOSIS — R87.810 CERVICAL HIGH RISK HPV (HUMAN PAPILLOMAVIRUS) TEST POSITIVE: ICD-10-CM

## 2021-04-07 DIAGNOSIS — Z30.9 ENCOUNTER FOR CONTRACEPTIVE MANAGEMENT, UNSPECIFIED TYPE: ICD-10-CM

## 2021-04-07 PROCEDURE — 86703 HIV-1/HIV-2 1 RESULT ANTBDY: CPT | Performed by: OBSTETRICS & GYNECOLOGY

## 2021-04-07 PROCEDURE — 99999 PR PBB SHADOW E&M-EST. PATIENT-LVL III: CPT | Mod: PBBFAC,,, | Performed by: OBSTETRICS & GYNECOLOGY

## 2021-04-07 PROCEDURE — 87624 HPV HI-RISK TYP POOLED RSLT: CPT | Performed by: OBSTETRICS & GYNECOLOGY

## 2021-04-07 PROCEDURE — 86592 SYPHILIS TEST NON-TREP QUAL: CPT | Performed by: OBSTETRICS & GYNECOLOGY

## 2021-04-07 PROCEDURE — 88175 CYTOPATH C/V AUTO FLUID REDO: CPT | Performed by: OBSTETRICS & GYNECOLOGY

## 2021-04-07 PROCEDURE — 87625 HPV TYPES 16 & 18 ONLY: CPT | Performed by: OBSTETRICS & GYNECOLOGY

## 2021-04-07 PROCEDURE — 99999 PR PBB SHADOW E&M-EST. PATIENT-LVL III: ICD-10-PCS | Mod: PBBFAC,,, | Performed by: OBSTETRICS & GYNECOLOGY

## 2021-04-07 PROCEDURE — 99213 OFFICE O/P EST LOW 20 MIN: CPT | Mod: PBBFAC,PO | Performed by: OBSTETRICS & GYNECOLOGY

## 2021-04-07 PROCEDURE — 87661 TRICHOMONAS VAGINALIS AMPLIF: CPT | Performed by: OBSTETRICS & GYNECOLOGY

## 2021-04-07 PROCEDURE — 87340 HEPATITIS B SURFACE AG IA: CPT | Performed by: OBSTETRICS & GYNECOLOGY

## 2021-04-07 PROCEDURE — 99395 PR PREVENTIVE VISIT,EST,18-39: ICD-10-PCS | Mod: S$PBB,,, | Performed by: OBSTETRICS & GYNECOLOGY

## 2021-04-07 PROCEDURE — 99395 PREV VISIT EST AGE 18-39: CPT | Mod: S$PBB,,, | Performed by: OBSTETRICS & GYNECOLOGY

## 2021-04-07 PROCEDURE — 86803 HEPATITIS C AB TEST: CPT | Performed by: OBSTETRICS & GYNECOLOGY

## 2021-04-07 PROCEDURE — 87481 CANDIDA DNA AMP PROBE: CPT | Mod: 59 | Performed by: OBSTETRICS & GYNECOLOGY

## 2021-04-07 PROCEDURE — 36415 COLL VENOUS BLD VENIPUNCTURE: CPT | Performed by: OBSTETRICS & GYNECOLOGY

## 2021-04-07 RX ORDER — FLUOXETINE HYDROCHLORIDE 20 MG/1
20 CAPSULE ORAL DAILY
Qty: 90 CAPSULE | Refills: 3 | Status: SHIPPED | OUTPATIENT
Start: 2021-04-07 | End: 2022-04-17

## 2021-04-07 RX ORDER — PSYLLIUM HUSK 0.4 G
CAPSULE ORAL
COMMUNITY
End: 2021-05-19

## 2021-04-07 RX ORDER — CETIRIZINE HYDROCHLORIDE, PSEUDOEPHEDRINE HYDROCHLORIDE 5; 120 MG/1; MG/1
TABLET, FILM COATED, EXTENDED RELEASE ORAL
COMMUNITY
End: 2021-05-19

## 2021-04-08 LAB
HBV SURFACE AG SERPL QL IA: NEGATIVE
HCV AB SERPL QL IA: NEGATIVE
HIV 1+2 AB+HIV1 P24 AG SERPL QL IA: NEGATIVE
RPR SER QL: NORMAL

## 2021-04-09 ENCOUNTER — TELEPHONE (OUTPATIENT)
Dept: OBSTETRICS AND GYNECOLOGY | Facility: CLINIC | Age: 36
End: 2021-04-09

## 2021-04-09 LAB
C TRACH RRNA SPEC QL NAA+PROBE: NEGATIVE
N GONORRHOEA RRNA SPEC QL NAA+PROBE: POSITIVE

## 2021-04-10 ENCOUNTER — TELEPHONE (OUTPATIENT)
Dept: OBSTETRICS AND GYNECOLOGY | Facility: CLINIC | Age: 36
End: 2021-04-10

## 2021-04-10 RX ORDER — CEFTRIAXONE 500 MG/1
500 INJECTION, POWDER, FOR SOLUTION INTRAMUSCULAR; INTRAVENOUS
Status: COMPLETED | OUTPATIENT
Start: 2021-04-10 | End: 2021-04-12

## 2021-04-12 ENCOUNTER — CLINICAL SUPPORT (OUTPATIENT)
Dept: OBSTETRICS AND GYNECOLOGY | Facility: CLINIC | Age: 36
End: 2021-04-12
Payer: MEDICAID

## 2021-04-12 LAB
BACTERIAL VAGINOSIS DNA: NEGATIVE
CANDIDA GLABRATA DNA: NEGATIVE
CANDIDA KRUSEI DNA: NEGATIVE
CANDIDA RRNA VAG QL PROBE: NEGATIVE
T VAGINALIS RRNA GENITAL QL PROBE: NEGATIVE

## 2021-04-12 PROCEDURE — 96372 THER/PROPH/DIAG INJ SC/IM: CPT | Mod: PBBFAC,PO

## 2021-04-12 RX ADMIN — CEFTRIAXONE SODIUM 500 MG: 500 INJECTION, POWDER, FOR SOLUTION INTRAMUSCULAR; INTRAVENOUS at 04:04

## 2021-04-15 LAB
CLINICAL INFO: NORMAL
CYTO CVX: NORMAL
CYTOLOGIST CVX/VAG CYTO: NORMAL
CYTOLOGIST CVX/VAG CYTO: NORMAL
CYTOLOGY CMNT CVX/VAG CYTO-IMP: NORMAL
CYTOLOGY PAP THIN PREP EXPLANATION: NORMAL
DATE OF PREVIOUS PAP: NORMAL
DATE PREVIOUS BX: NO
HPV I/H RISK 4 DNA CVX QL NAA+PROBE: DETECTED
LMP START DATE: NORMAL
SPECIMEN SOURCE CVX/VAG CYTO: NORMAL
STAT OF ADQ CVX/VAG CYTO-IMP: NORMAL

## 2021-04-16 LAB
HPV16 DNA CVX QL PROBE+SIG AMP: NOT DETECTED
HPV18 DNA CVX QL PROBE+SIG AMP: NOT DETECTED

## 2021-04-19 ENCOUNTER — TELEPHONE (OUTPATIENT)
Dept: OBSTETRICS AND GYNECOLOGY | Facility: CLINIC | Age: 36
End: 2021-04-19

## 2021-04-23 ENCOUNTER — TELEPHONE (OUTPATIENT)
Dept: OBSTETRICS AND GYNECOLOGY | Facility: CLINIC | Age: 36
End: 2021-04-23

## 2021-04-29 ENCOUNTER — OFFICE VISIT (OUTPATIENT)
Dept: OBSTETRICS AND GYNECOLOGY | Facility: CLINIC | Age: 36
End: 2021-04-29
Payer: MEDICAID

## 2021-04-29 VITALS — WEIGHT: 148 LBS | SYSTOLIC BLOOD PRESSURE: 112 MMHG | DIASTOLIC BLOOD PRESSURE: 76 MMHG | BODY MASS INDEX: 29.89 KG/M2

## 2021-04-29 DIAGNOSIS — Z30.46 ENCOUNTER FOR REMOVAL AND REINSERTION OF NEXPLANON: Primary | ICD-10-CM

## 2021-04-29 PROCEDURE — 99213 OFFICE O/P EST LOW 20 MIN: CPT | Mod: PBBFAC,PO,25 | Performed by: OBSTETRICS & GYNECOLOGY

## 2021-04-29 PROCEDURE — 99999 PR PBB SHADOW E&M-EST. PATIENT-LVL III: ICD-10-PCS | Mod: PBBFAC,,, | Performed by: OBSTETRICS & GYNECOLOGY

## 2021-04-29 PROCEDURE — 11983 PR REMOVAL W/ REINSERT DRUG IMPLANT DEVICE: ICD-10-PCS | Mod: S$PBB,,, | Performed by: OBSTETRICS & GYNECOLOGY

## 2021-04-29 PROCEDURE — 99499 NO LOS: ICD-10-PCS | Mod: S$PBB,,, | Performed by: OBSTETRICS & GYNECOLOGY

## 2021-04-29 PROCEDURE — 99999 PR PBB SHADOW E&M-EST. PATIENT-LVL III: CPT | Mod: PBBFAC,,, | Performed by: OBSTETRICS & GYNECOLOGY

## 2021-04-29 PROCEDURE — 11983 REMOVE/INSERT DRUG IMPLANT: CPT | Mod: PBBFAC,PO | Performed by: OBSTETRICS & GYNECOLOGY

## 2021-04-29 PROCEDURE — 99499 UNLISTED E&M SERVICE: CPT | Mod: S$PBB,,, | Performed by: OBSTETRICS & GYNECOLOGY

## 2021-04-29 PROCEDURE — 11983 REMOVE/INSERT DRUG IMPLANT: CPT | Mod: S$PBB,,, | Performed by: OBSTETRICS & GYNECOLOGY

## 2021-04-29 RX ORDER — ETONOGESTREL 68 MG/1
68 IMPLANT SUBCUTANEOUS ONCE
COMMUNITY
Start: 2021-04-29 | End: 2024-04-29

## 2021-05-19 ENCOUNTER — PROCEDURE VISIT (OUTPATIENT)
Dept: OBSTETRICS AND GYNECOLOGY | Facility: CLINIC | Age: 36
End: 2021-05-19
Payer: MEDICAID

## 2021-05-19 VITALS
HEIGHT: 59 IN | DIASTOLIC BLOOD PRESSURE: 64 MMHG | SYSTOLIC BLOOD PRESSURE: 102 MMHG | BODY MASS INDEX: 29.9 KG/M2 | WEIGHT: 148.31 LBS

## 2021-05-19 DIAGNOSIS — R87.810 CERVICAL HIGH RISK HPV (HUMAN PAPILLOMAVIRUS) TEST POSITIVE: Primary | ICD-10-CM

## 2021-05-19 LAB
B-HCG UR QL: NEGATIVE
CTP QC/QA: YES

## 2021-05-19 PROCEDURE — 99499 NO LOS: ICD-10-PCS | Mod: S$PBB,,, | Performed by: OBSTETRICS & GYNECOLOGY

## 2021-05-19 PROCEDURE — 99499 UNLISTED E&M SERVICE: CPT | Mod: S$PBB,,, | Performed by: OBSTETRICS & GYNECOLOGY

## 2021-05-19 PROCEDURE — 57454 COLPOSCOPY: ICD-10-PCS | Mod: S$PBB,,, | Performed by: OBSTETRICS & GYNECOLOGY

## 2021-05-19 PROCEDURE — 57454 BX/CURETT OF CERVIX W/SCOPE: CPT | Mod: PBBFAC,PO | Performed by: OBSTETRICS & GYNECOLOGY

## 2021-05-19 PROCEDURE — 81025 URINE PREGNANCY TEST: CPT | Mod: PBBFAC,PO | Performed by: OBSTETRICS & GYNECOLOGY

## 2021-05-19 PROCEDURE — 88305 TISSUE EXAM BY PATHOLOGIST: CPT | Mod: 26,,, | Performed by: PATHOLOGY

## 2021-05-19 PROCEDURE — 88305 TISSUE EXAM BY PATHOLOGIST: CPT | Mod: 59 | Performed by: PATHOLOGY

## 2021-05-19 PROCEDURE — 88305 TISSUE EXAM BY PATHOLOGIST: ICD-10-PCS | Mod: 26,,, | Performed by: PATHOLOGY

## 2021-05-19 RX ORDER — DICYCLOMINE HYDROCHLORIDE 20 MG/1
20 TABLET ORAL 3 TIMES DAILY
COMMUNITY
Start: 2021-05-11

## 2021-05-28 LAB
FINAL PATHOLOGIC DIAGNOSIS: NORMAL
GROSS: NORMAL
Lab: NORMAL

## 2021-05-31 ENCOUNTER — TELEPHONE (OUTPATIENT)
Dept: OBSTETRICS AND GYNECOLOGY | Facility: CLINIC | Age: 36
End: 2021-05-31

## 2021-06-01 ENCOUNTER — PATIENT MESSAGE (OUTPATIENT)
Dept: OBSTETRICS AND GYNECOLOGY | Facility: CLINIC | Age: 36
End: 2021-06-01

## 2021-06-10 ENCOUNTER — TELEPHONE (OUTPATIENT)
Dept: OBSTETRICS AND GYNECOLOGY | Facility: CLINIC | Age: 36
End: 2021-06-10

## 2021-07-02 ENCOUNTER — OFFICE VISIT (OUTPATIENT)
Dept: OBSTETRICS AND GYNECOLOGY | Facility: CLINIC | Age: 36
End: 2021-07-02
Payer: MEDICAID

## 2021-07-02 VITALS — BODY MASS INDEX: 31.1 KG/M2 | WEIGHT: 154 LBS | SYSTOLIC BLOOD PRESSURE: 102 MMHG | DIASTOLIC BLOOD PRESSURE: 74 MMHG

## 2021-07-02 DIAGNOSIS — N87.0 MILD DYSPLASIA OF CERVIX: Primary | ICD-10-CM

## 2021-07-02 DIAGNOSIS — Z01.818 PRE-OP EXAM: ICD-10-CM

## 2021-07-02 PROCEDURE — 99499 NO LOS: ICD-10-PCS | Mod: S$PBB,,, | Performed by: OBSTETRICS & GYNECOLOGY

## 2021-07-02 PROCEDURE — 99999 PR PBB SHADOW E&M-EST. PATIENT-LVL III: ICD-10-PCS | Mod: PBBFAC,,, | Performed by: OBSTETRICS & GYNECOLOGY

## 2021-07-02 PROCEDURE — 99999 PR PBB SHADOW E&M-EST. PATIENT-LVL III: CPT | Mod: PBBFAC,,, | Performed by: OBSTETRICS & GYNECOLOGY

## 2021-07-02 PROCEDURE — 99499 UNLISTED E&M SERVICE: CPT | Mod: S$PBB,,, | Performed by: OBSTETRICS & GYNECOLOGY

## 2021-07-02 PROCEDURE — 99213 OFFICE O/P EST LOW 20 MIN: CPT | Mod: PBBFAC,PO | Performed by: OBSTETRICS & GYNECOLOGY

## 2021-07-02 RX ORDER — SODIUM CHLORIDE, SODIUM LACTATE, POTASSIUM CHLORIDE, CALCIUM CHLORIDE 600; 310; 30; 20 MG/100ML; MG/100ML; MG/100ML; MG/100ML
INJECTION, SOLUTION INTRAVENOUS CONTINUOUS
Status: CANCELLED | OUTPATIENT
Start: 2021-07-02

## 2021-07-02 RX ORDER — MUPIROCIN 20 MG/G
OINTMENT TOPICAL
Status: CANCELLED | OUTPATIENT
Start: 2021-07-02

## 2021-07-12 ENCOUNTER — TELEPHONE (OUTPATIENT)
Dept: OBSTETRICS AND GYNECOLOGY | Facility: CLINIC | Age: 36
End: 2021-07-12

## 2021-07-13 ENCOUNTER — LAB VISIT (OUTPATIENT)
Dept: SPORTS MEDICINE | Facility: CLINIC | Age: 36
End: 2021-07-13
Payer: MEDICAID

## 2021-07-13 DIAGNOSIS — Z01.818 PRE-OP TESTING: ICD-10-CM

## 2021-07-13 PROCEDURE — U0005 INFEC AGEN DETEC AMPLI PROBE: HCPCS | Performed by: OBSTETRICS & GYNECOLOGY

## 2021-07-13 PROCEDURE — U0003 INFECTIOUS AGENT DETECTION BY NUCLEIC ACID (DNA OR RNA); SEVERE ACUTE RESPIRATORY SYNDROME CORONAVIRUS 2 (SARS-COV-2) (CORONAVIRUS DISEASE [COVID-19]), AMPLIFIED PROBE TECHNIQUE, MAKING USE OF HIGH THROUGHPUT TECHNOLOGIES AS DESCRIBED BY CMS-2020-01-R: HCPCS | Performed by: OBSTETRICS & GYNECOLOGY

## 2021-07-14 LAB
SARS-COV-2 RNA RESP QL NAA+PROBE: NOT DETECTED
SARS-COV-2- CYCLE NUMBER: -1

## 2021-07-16 ENCOUNTER — ANESTHESIA (OUTPATIENT)
Dept: SURGERY | Facility: HOSPITAL | Age: 36
End: 2021-07-16
Payer: MEDICAID

## 2021-07-16 ENCOUNTER — ANESTHESIA EVENT (OUTPATIENT)
Dept: SURGERY | Facility: HOSPITAL | Age: 36
End: 2021-07-16
Payer: MEDICAID

## 2021-07-16 ENCOUNTER — HOSPITAL ENCOUNTER (OUTPATIENT)
Facility: HOSPITAL | Age: 36
Discharge: HOME | End: 2021-07-16
Attending: OBSTETRICS & GYNECOLOGY | Admitting: OBSTETRICS & GYNECOLOGY
Payer: MEDICAID

## 2021-07-16 VITALS
OXYGEN SATURATION: 98 % | RESPIRATION RATE: 16 BRPM | SYSTOLIC BLOOD PRESSURE: 108 MMHG | DIASTOLIC BLOOD PRESSURE: 77 MMHG | HEART RATE: 66 BPM | HEIGHT: 55 IN | BODY MASS INDEX: 32.4 KG/M2 | WEIGHT: 140 LBS | TEMPERATURE: 98 F

## 2021-07-16 DIAGNOSIS — N87.0 MILD DYSPLASIA OF CERVIX: ICD-10-CM

## 2021-07-16 LAB
B-HCG UR QL: NEGATIVE
CTP QC/QA: YES

## 2021-07-16 PROCEDURE — 25000003 PHARM REV CODE 250: Performed by: NURSE ANESTHETIST, CERTIFIED REGISTERED

## 2021-07-16 PROCEDURE — 71000015 HC POSTOP RECOV 1ST HR: Performed by: OBSTETRICS & GYNECOLOGY

## 2021-07-16 PROCEDURE — 81025 URINE PREGNANCY TEST: CPT | Performed by: OBSTETRICS & GYNECOLOGY

## 2021-07-16 PROCEDURE — 88307 PR  SURG PATH,LEVEL V: ICD-10-PCS | Mod: 26,,, | Performed by: PATHOLOGY

## 2021-07-16 PROCEDURE — 88307 TISSUE EXAM BY PATHOLOGIST: CPT | Performed by: PATHOLOGY

## 2021-07-16 PROCEDURE — 63600175 PHARM REV CODE 636 W HCPCS: Performed by: OBSTETRICS & GYNECOLOGY

## 2021-07-16 PROCEDURE — 00940 ANES VAGINAL PX NOS: CPT | Performed by: OBSTETRICS & GYNECOLOGY

## 2021-07-16 PROCEDURE — 57520 PR CONIZATION CERVIX,KNIFE/LASER: ICD-10-PCS | Mod: ,,, | Performed by: OBSTETRICS & GYNECOLOGY

## 2021-07-16 PROCEDURE — 88307 TISSUE EXAM BY PATHOLOGIST: CPT | Mod: 26,,, | Performed by: PATHOLOGY

## 2021-07-16 PROCEDURE — 88305 TISSUE EXAM BY PATHOLOGIST: CPT | Mod: 26,,, | Performed by: PATHOLOGY

## 2021-07-16 PROCEDURE — 57520 CONIZATION OF CERVIX: CPT | Mod: ,,, | Performed by: OBSTETRICS & GYNECOLOGY

## 2021-07-16 PROCEDURE — 63600175 PHARM REV CODE 636 W HCPCS: Performed by: NURSE ANESTHETIST, CERTIFIED REGISTERED

## 2021-07-16 PROCEDURE — 88305 TISSUE EXAM BY PATHOLOGIST: CPT | Performed by: PATHOLOGY

## 2021-07-16 PROCEDURE — 88305 TISSUE EXAM BY PATHOLOGIST: ICD-10-PCS | Mod: 26,,, | Performed by: PATHOLOGY

## 2021-07-16 PROCEDURE — 36000707: Performed by: OBSTETRICS & GYNECOLOGY

## 2021-07-16 PROCEDURE — 37000008 HC ANESTHESIA 1ST 15 MINUTES: Performed by: OBSTETRICS & GYNECOLOGY

## 2021-07-16 PROCEDURE — 25000003 PHARM REV CODE 250: Performed by: OBSTETRICS & GYNECOLOGY

## 2021-07-16 PROCEDURE — 37000009 HC ANESTHESIA EA ADD 15 MINS: Performed by: OBSTETRICS & GYNECOLOGY

## 2021-07-16 PROCEDURE — 36000706: Performed by: OBSTETRICS & GYNECOLOGY

## 2021-07-16 RX ORDER — ONDANSETRON 8 MG/1
8 TABLET, ORALLY DISINTEGRATING ORAL EVERY 8 HOURS PRN
Status: DISCONTINUED | OUTPATIENT
Start: 2021-07-16 | End: 2021-07-16 | Stop reason: HOSPADM

## 2021-07-16 RX ORDER — PROPOFOL 10 MG/ML
VIAL (ML) INTRAVENOUS
Status: DISCONTINUED | OUTPATIENT
Start: 2021-07-16 | End: 2021-07-16

## 2021-07-16 RX ORDER — OXYCODONE HYDROCHLORIDE 5 MG/1
5 TABLET ORAL EVERY 4 HOURS PRN
Status: DISCONTINUED | OUTPATIENT
Start: 2021-07-16 | End: 2021-07-16 | Stop reason: HOSPADM

## 2021-07-16 RX ORDER — IBUPROFEN 600 MG/1
600 TABLET ORAL EVERY 6 HOURS
Qty: 40 TABLET | Refills: 0 | Status: SHIPPED | OUTPATIENT
Start: 2021-07-16 | End: 2022-04-25

## 2021-07-16 RX ORDER — MUPIROCIN 20 MG/G
OINTMENT TOPICAL
Status: DISCONTINUED | OUTPATIENT
Start: 2021-07-16 | End: 2021-07-16 | Stop reason: HOSPADM

## 2021-07-16 RX ORDER — SODIUM CHLORIDE, SODIUM LACTATE, POTASSIUM CHLORIDE, CALCIUM CHLORIDE 600; 310; 30; 20 MG/100ML; MG/100ML; MG/100ML; MG/100ML
INJECTION, SOLUTION INTRAVENOUS CONTINUOUS
Status: DISCONTINUED | OUTPATIENT
Start: 2021-07-16 | End: 2021-07-16 | Stop reason: HOSPADM

## 2021-07-16 RX ORDER — FENTANYL CITRATE 50 UG/ML
INJECTION, SOLUTION INTRAMUSCULAR; INTRAVENOUS
Status: DISCONTINUED | OUTPATIENT
Start: 2021-07-16 | End: 2021-07-16

## 2021-07-16 RX ORDER — FERRIC SUBSULFATE 20-22G/100
SOLUTION, NON-ORAL MISCELLANEOUS
Status: DISCONTINUED | OUTPATIENT
Start: 2021-07-16 | End: 2021-07-16 | Stop reason: HOSPADM

## 2021-07-16 RX ORDER — HYDROCODONE BITARTRATE AND ACETAMINOPHEN 5; 325 MG/1; MG/1
1 TABLET ORAL EVERY 4 HOURS PRN
Qty: 15 TABLET | Refills: 0 | Status: SHIPPED | OUTPATIENT
Start: 2021-07-16 | End: 2022-04-25

## 2021-07-16 RX ORDER — PROPOFOL 10 MG/ML
VIAL (ML) INTRAVENOUS CONTINUOUS PRN
Status: DISCONTINUED | OUTPATIENT
Start: 2021-07-16 | End: 2021-07-16

## 2021-07-16 RX ORDER — ONDANSETRON 8 MG/1
8 TABLET, ORALLY DISINTEGRATING ORAL EVERY 8 HOURS PRN
Qty: 15 TABLET | Refills: 0 | Status: SHIPPED | OUTPATIENT
Start: 2021-07-16 | End: 2022-04-25

## 2021-07-16 RX ORDER — PROCHLORPERAZINE EDISYLATE 5 MG/ML
5 INJECTION INTRAMUSCULAR; INTRAVENOUS EVERY 6 HOURS PRN
Status: DISCONTINUED | OUTPATIENT
Start: 2021-07-16 | End: 2021-07-16 | Stop reason: HOSPADM

## 2021-07-16 RX ORDER — LIDOCAINE HCL/PF 100 MG/5ML
SYRINGE (ML) INTRAVENOUS
Status: DISCONTINUED | OUTPATIENT
Start: 2021-07-16 | End: 2021-07-16

## 2021-07-16 RX ORDER — OXYCODONE HYDROCHLORIDE 5 MG/1
10 TABLET ORAL EVERY 4 HOURS PRN
Status: DISCONTINUED | OUTPATIENT
Start: 2021-07-16 | End: 2021-07-16 | Stop reason: HOSPADM

## 2021-07-16 RX ORDER — MIDAZOLAM HYDROCHLORIDE 1 MG/ML
INJECTION INTRAMUSCULAR; INTRAVENOUS
Status: DISCONTINUED | OUTPATIENT
Start: 2021-07-16 | End: 2021-07-16

## 2021-07-16 RX ORDER — DIPHENHYDRAMINE HCL 25 MG
25 CAPSULE ORAL EVERY 4 HOURS PRN
Status: DISCONTINUED | OUTPATIENT
Start: 2021-07-16 | End: 2021-07-16 | Stop reason: HOSPADM

## 2021-07-16 RX ADMIN — MIDAZOLAM HYDROCHLORIDE 2 MG: 1 INJECTION, SOLUTION INTRAMUSCULAR; INTRAVENOUS at 06:07

## 2021-07-16 RX ADMIN — FENTANYL CITRATE 25 MCG: 50 INJECTION, SOLUTION INTRAMUSCULAR; INTRAVENOUS at 07:07

## 2021-07-16 RX ADMIN — PROPOFOL 150 MCG/KG/MIN: 10 INJECTION, EMULSION INTRAVENOUS at 07:07

## 2021-07-16 RX ADMIN — LIDOCAINE HYDROCHLORIDE 50 MG: 20 INJECTION, SOLUTION INTRAVENOUS at 07:07

## 2021-07-16 RX ADMIN — MUPIROCIN: 20 OINTMENT TOPICAL at 06:07

## 2021-07-16 RX ADMIN — SODIUM CHLORIDE, SODIUM LACTATE, POTASSIUM CHLORIDE, AND CALCIUM CHLORIDE: .6; .31; .03; .02 INJECTION, SOLUTION INTRAVENOUS at 06:07

## 2021-07-16 RX ADMIN — PROPOFOL 40 MG: 10 INJECTION, EMULSION INTRAVENOUS at 07:07

## 2021-07-17 ENCOUNTER — NURSE TRIAGE (OUTPATIENT)
Dept: ADMINISTRATIVE | Facility: CLINIC | Age: 36
End: 2021-07-17

## 2021-07-20 LAB
FINAL PATHOLOGIC DIAGNOSIS: NORMAL
GROSS: NORMAL
Lab: NORMAL

## 2021-07-21 ENCOUNTER — TELEPHONE (OUTPATIENT)
Dept: OBSTETRICS AND GYNECOLOGY | Facility: CLINIC | Age: 36
End: 2021-07-21

## 2021-07-21 DIAGNOSIS — N87.1 MODERATE DYSPLASIA OF CERVIX: ICD-10-CM

## 2021-07-21 RX ORDER — METRONIDAZOLE 500 MG/1
500 TABLET ORAL EVERY 12 HOURS
Qty: 14 TABLET | Refills: 0 | Status: SHIPPED | OUTPATIENT
Start: 2021-07-21 | End: 2021-07-28

## 2021-08-06 ENCOUNTER — OFFICE VISIT (OUTPATIENT)
Dept: OBSTETRICS AND GYNECOLOGY | Facility: CLINIC | Age: 36
End: 2021-08-06
Payer: MEDICAID

## 2021-08-06 VITALS
SYSTOLIC BLOOD PRESSURE: 108 MMHG | WEIGHT: 154.75 LBS | DIASTOLIC BLOOD PRESSURE: 72 MMHG | BODY MASS INDEX: 4352.41 KG/M2

## 2021-08-06 DIAGNOSIS — N76.2 ACUTE VULVITIS: ICD-10-CM

## 2021-08-06 DIAGNOSIS — Z98.890 POST-OPERATIVE STATE: Primary | ICD-10-CM

## 2021-08-06 DIAGNOSIS — N89.8 VAGINAL DISCHARGE: ICD-10-CM

## 2021-08-06 PROCEDURE — 87481 CANDIDA DNA AMP PROBE: CPT | Mod: 59 | Performed by: OBSTETRICS & GYNECOLOGY

## 2021-08-06 PROCEDURE — 99024 PR POST-OP FOLLOW-UP VISIT: ICD-10-PCS | Mod: ,,, | Performed by: OBSTETRICS & GYNECOLOGY

## 2021-08-06 PROCEDURE — 99999 PR PBB SHADOW E&M-EST. PATIENT-LVL III: CPT | Mod: PBBFAC,,, | Performed by: OBSTETRICS & GYNECOLOGY

## 2021-08-06 PROCEDURE — 99999 PR PBB SHADOW E&M-EST. PATIENT-LVL III: ICD-10-PCS | Mod: PBBFAC,,, | Performed by: OBSTETRICS & GYNECOLOGY

## 2021-08-06 PROCEDURE — 99024 POSTOP FOLLOW-UP VISIT: CPT | Mod: ,,, | Performed by: OBSTETRICS & GYNECOLOGY

## 2021-08-06 PROCEDURE — 99213 OFFICE O/P EST LOW 20 MIN: CPT | Mod: PBBFAC,PO | Performed by: OBSTETRICS & GYNECOLOGY

## 2021-11-29 ENCOUNTER — TELEPHONE (OUTPATIENT)
Dept: OBSTETRICS AND GYNECOLOGY | Facility: CLINIC | Age: 36
End: 2021-11-29
Payer: MEDICAID

## 2021-11-29 ENCOUNTER — LAB VISIT (OUTPATIENT)
Dept: LAB | Facility: HOSPITAL | Age: 36
End: 2021-11-29
Attending: OBSTETRICS & GYNECOLOGY
Payer: MEDICAID

## 2021-11-29 DIAGNOSIS — R39.9 UTI SYMPTOMS: Primary | ICD-10-CM

## 2021-11-29 DIAGNOSIS — R39.9 UTI SYMPTOMS: ICD-10-CM

## 2021-11-29 PROCEDURE — 87088 URINE BACTERIA CULTURE: CPT | Performed by: OBSTETRICS & GYNECOLOGY

## 2021-11-29 PROCEDURE — 87086 URINE CULTURE/COLONY COUNT: CPT | Performed by: OBSTETRICS & GYNECOLOGY

## 2021-11-29 PROCEDURE — 87147 CULTURE TYPE IMMUNOLOGIC: CPT | Performed by: OBSTETRICS & GYNECOLOGY

## 2021-11-30 ENCOUNTER — TELEPHONE (OUTPATIENT)
Dept: OBSTETRICS AND GYNECOLOGY | Facility: CLINIC | Age: 36
End: 2021-11-30
Payer: MEDICAID

## 2021-12-01 LAB — BACTERIA UR CULT: ABNORMAL

## 2021-12-01 RX ORDER — PENICILLIN V POTASSIUM 500 MG/1
500 TABLET, FILM COATED ORAL EVERY 6 HOURS
Qty: 28 TABLET | Refills: 0 | Status: SHIPPED | OUTPATIENT
Start: 2021-12-01 | End: 2022-04-25

## 2022-04-22 ENCOUNTER — TELEPHONE (OUTPATIENT)
Dept: OBSTETRICS AND GYNECOLOGY | Facility: CLINIC | Age: 37
End: 2022-04-22
Payer: MEDICAID

## 2022-04-22 NOTE — TELEPHONE ENCOUNTER
----- Message from Lamar Briones sent at 4/22/2022  1:40 PM CDT -----  Contact: pt  Type:  RX Refill Request    Who Called: pt      valACYclovir (VALTREX) 500 MG tablet 30 tablet 5 10/13/2021  No  Sig: TAKE 1 TABLET(500 MG) BY MOUTH EVERY DAY  Sent to pharmacy as: valACYclovir (VALTREX) 500 MG tablet  Class: Normal  Order: [id number]  Date/Time Signed: 10/13/2021 08:29      E-Prescribing Status: Receipt confirmed by pharmacy (10/13/2021  8:29 AM CDT)        [facility] #[id number] - [place] [address] AT [facility] OF [facility] [address]   Phone:  [phone]  Fax:  [phone]          Would the patient rather a call back or a response via [facility]? call  Best Call Back Number:[phone] (M)   Additional Information:

## 2022-04-24 RX ORDER — VALACYCLOVIR HYDROCHLORIDE 500 MG/1
500 TABLET, FILM COATED ORAL DAILY
Qty: 30 TABLET | Refills: 5 | Status: SHIPPED | OUTPATIENT
Start: 2022-04-24 | End: 2022-04-25 | Stop reason: SDUPTHER

## 2022-04-25 ENCOUNTER — OFFICE VISIT (OUTPATIENT)
Dept: OBSTETRICS AND GYNECOLOGY | Facility: CLINIC | Age: 37
End: 2022-04-25
Payer: MEDICAID

## 2022-04-25 VITALS — DIASTOLIC BLOOD PRESSURE: 70 MMHG | WEIGHT: 170.88 LBS | BODY MASS INDEX: 4805 KG/M2 | SYSTOLIC BLOOD PRESSURE: 112 MMHG

## 2022-04-25 DIAGNOSIS — Z12.4 ROUTINE CERVICAL SMEAR: ICD-10-CM

## 2022-04-25 DIAGNOSIS — Z01.419 WELL WOMAN EXAM WITH ROUTINE GYNECOLOGICAL EXAM: Primary | ICD-10-CM

## 2022-04-25 PROCEDURE — 99395 PREV VISIT EST AGE 18-39: CPT | Mod: S$PBB,,, | Performed by: OBSTETRICS & GYNECOLOGY

## 2022-04-25 PROCEDURE — 99212 OFFICE O/P EST SF 10 MIN: CPT | Mod: PBBFAC,PO | Performed by: OBSTETRICS & GYNECOLOGY

## 2022-04-25 PROCEDURE — 87624 HPV HI-RISK TYP POOLED RSLT: CPT | Performed by: OBSTETRICS & GYNECOLOGY

## 2022-04-25 PROCEDURE — 3074F SYST BP LT 130 MM HG: CPT | Mod: CPTII,,, | Performed by: OBSTETRICS & GYNECOLOGY

## 2022-04-25 PROCEDURE — 3074F PR MOST RECENT SYSTOLIC BLOOD PRESSURE < 130 MM HG: ICD-10-PCS | Mod: CPTII,,, | Performed by: OBSTETRICS & GYNECOLOGY

## 2022-04-25 PROCEDURE — 1160F PR REVIEW ALL MEDS BY PRESCRIBER/CLIN PHARMACIST DOCUMENTED: ICD-10-PCS | Mod: CPTII,,, | Performed by: OBSTETRICS & GYNECOLOGY

## 2022-04-25 PROCEDURE — 88175 CYTOPATH C/V AUTO FLUID REDO: CPT | Performed by: OBSTETRICS & GYNECOLOGY

## 2022-04-25 PROCEDURE — 99999 PR PBB SHADOW E&M-EST. PATIENT-LVL II: CPT | Mod: PBBFAC,,, | Performed by: OBSTETRICS & GYNECOLOGY

## 2022-04-25 PROCEDURE — 3078F DIAST BP <80 MM HG: CPT | Mod: CPTII,,, | Performed by: OBSTETRICS & GYNECOLOGY

## 2022-04-25 PROCEDURE — 3008F PR BODY MASS INDEX (BMI) DOCUMENTED: ICD-10-PCS | Mod: CPTII,,, | Performed by: OBSTETRICS & GYNECOLOGY

## 2022-04-25 PROCEDURE — 3078F PR MOST RECENT DIASTOLIC BLOOD PRESSURE < 80 MM HG: ICD-10-PCS | Mod: CPTII,,, | Performed by: OBSTETRICS & GYNECOLOGY

## 2022-04-25 PROCEDURE — 99999 PR PBB SHADOW E&M-EST. PATIENT-LVL II: ICD-10-PCS | Mod: PBBFAC,,, | Performed by: OBSTETRICS & GYNECOLOGY

## 2022-04-25 PROCEDURE — 1159F MED LIST DOCD IN RCRD: CPT | Mod: CPTII,,, | Performed by: OBSTETRICS & GYNECOLOGY

## 2022-04-25 PROCEDURE — 3008F BODY MASS INDEX DOCD: CPT | Mod: CPTII,,, | Performed by: OBSTETRICS & GYNECOLOGY

## 2022-04-25 PROCEDURE — 1159F PR MEDICATION LIST DOCUMENTED IN MEDICAL RECORD: ICD-10-PCS | Mod: CPTII,,, | Performed by: OBSTETRICS & GYNECOLOGY

## 2022-04-25 PROCEDURE — 1160F RVW MEDS BY RX/DR IN RCRD: CPT | Mod: CPTII,,, | Performed by: OBSTETRICS & GYNECOLOGY

## 2022-04-25 PROCEDURE — 99395 PR PREVENTIVE VISIT,EST,18-39: ICD-10-PCS | Mod: S$PBB,,, | Performed by: OBSTETRICS & GYNECOLOGY

## 2022-04-25 RX ORDER — FLUOXETINE HYDROCHLORIDE 20 MG/1
20 CAPSULE ORAL DAILY
Qty: 90 CAPSULE | Refills: 2 | Status: SHIPPED | OUTPATIENT
Start: 2022-04-25 | End: 2023-04-20 | Stop reason: SDUPTHER

## 2022-04-25 RX ORDER — VALACYCLOVIR HYDROCHLORIDE 500 MG/1
500 TABLET, FILM COATED ORAL DAILY
Qty: 90 TABLET | Refills: 1 | Status: SHIPPED | OUTPATIENT
Start: 2022-04-25 | End: 2023-06-29

## 2022-04-25 NOTE — PROGRESS NOTES
OBSTETRIC HISTORY:   OB History        3    Para   2    Term   2            AB   1    Living   2       SAB        IAB   1    Ectopic        Multiple        Live Births   2                COMPREHENSIVE GYN HISTORY:  PAP History: Reports abnormal Paps. +HRHPV 2020  Infection History: Reports STDs: Herpes. Denies PID.  Benign History: Denies uterine fibroids. Denies ovarian cysts. Denies endometriosis.   Cancer History: Denies cervical cancer. Denies uterine cancer or hyperplasia. Denies ovarian cancer. Denies vulvar cancer or pre-cancer. Denies vaginal cancer or pre-cancer. Denies breast cancer. Denies colon cancer.  Sexual Activity History:  reports that she currently engages in sexual activity. She reports using the following method of birth control/protection: Implant.   Menstrual History:  Every 28 days, flows for 3 days. Moderate flow.  Dysmenorrhea History: Denies dysmenorrhea.  Contraception: Nexplanon  Inserted 21          HPI:   36 y.o.  No LMP recorded (lmp unknown). Patient has had an implant.   Patient is  here for her annual gynecologic exam.  She has no GYN complaints. She denies bladder, breast and bowel complaints.    ROS:  GENERAL: Denies weight gain or weight loss. Feeling well overall.   SKIN: Denies rash or lesions.   HEAD: Denies headache.   NODES: Denies enlarged lymph nodes.   CHEST: Denies shortness of breath.   ABDOMEN: No abdominal pain, constipation, diarrhea, nausea, vomiting or rectal bleeding.   URINARY: No frequency, dysuria, hematuria, or burning on urination.  REPRODUCTIVE: See HPI.   BREASTS: The patient denies pain, lumps, or nipple discharge.   HEMATOLOGIC: No easy bruisability.   MUSCULOSKELETAL: Denies joint pain or back pain.   NEUROLOGIC: Denies weakness.   PSYCHIATRIC: Denies depression, anxiety or mood swings.    PE:   /70   Wt 77.5 kg (170 lb 13.7 oz)   LMP  (LMP Unknown)   BMI 4805.00 kg/m²   APPEARANCE: Well nourished, well developed, in no  acute distress.  NECK: Neck symmetric without  thyromegaly.  NODES: No inguinal, cervical lymph node enlargement.  CHEST: Lungs clear to auscultation.  HEART: Regular rate and rhythm, no murmurs, rubs or gallops.  ABDOMEN: Soft. No tenderness or masses. No hernias.  BREASTS: Symmetrical, no skin changes or visible lesions. No palpable masses, nipple discharge or adenopathy bilaterally.  PELVIC:   VULVA: No lesions. Normal female genitalia.  URETHRAL MEATUS: Normal size and location, no lesions, no prolapse.  URETHRA: No masses, tenderness, prolapse or scarring.  VAGINA: Moist and well rugated, no discharge, no significant cystocele or rectocele.  CERVIX: No lesions and discharge.  UTERUS: Normal size, regular shape, mobile, non-tender, bladder base nontender.  ADNEXA: No masses or tenderness.    PROCEDURES:  Pap smear  HRHPV    Assessment:  Normal Gynecologic Exam    Plan:  Mammogram and Colonoscopy if indicated by current recommendations.  Return to clinic in one year or for any problems or complaints.    Counseling:  Patient was counseled today on A.C.S. Pap guidelines and recommendations for yearly pelvic exams and monthly self breast exams; to see her PCP for other health maintenance. Regular exercise and healthy diet.     As of April 1, 2021, the Cures Act has been passed nationally. This new law requires that all doctors progress notes, lab results, pathology reports and radiology reports be released IMMEDIATELY to the patient in the patient portal. That means that the results are released to you at the EXACT same time they are released to me. Therefore, with all of the patients that I have I am not able to reply to each patient exactly when the results come in. So there will be a delay from when you see the results to when I see them and have time to come up with a response to send you. Also I only see these results when I am on the computer at work. So if the results come in over the weekend or after 5 pm of  a work day, I will not see them until the next business day. As you can tell, this is a challenge as a physician to give every patient the quick response they hope for and deserve. So please be patient!     Thanks for understanding,

## 2022-05-03 LAB
FINAL PATHOLOGIC DIAGNOSIS: NORMAL
Lab: NORMAL

## 2022-05-04 ENCOUNTER — PATIENT MESSAGE (OUTPATIENT)
Dept: OBSTETRICS AND GYNECOLOGY | Facility: CLINIC | Age: 37
End: 2022-05-04
Payer: MEDICAID

## 2022-05-04 NOTE — TELEPHONE ENCOUNTER
Message sent to patient:    Your pap smear was normal but you were positive HRHPV. The recommendation is to repeat the Colposcopy. Let me know when you get this message so Tila can get you scheduled.  Dr. Wang

## 2022-05-31 ENCOUNTER — TELEPHONE (OUTPATIENT)
Dept: OBSTETRICS AND GYNECOLOGY | Facility: CLINIC | Age: 37
End: 2022-05-31
Payer: MEDICAID

## 2022-05-31 NOTE — TELEPHONE ENCOUNTER
Patient advised to provide urine sample or go to urgent care    Tila Pinedo MA  05/31/2022 9:27 AM

## 2022-05-31 NOTE — TELEPHONE ENCOUNTER
----- Message from Maddison Davila sent at 5/31/2022  8:25 AM CDT -----  Contact: [phone]/Self  Type:  Needs Medical Advice    Who Called: Pt   Symptoms (please be specific): bladder infection   How long has patient had these symptoms:  4 days  Pharmacy name and phone #:  [id number] [phone]  Would the patient rather a call back or a response via [facility]? Callback   Best Call Back Number: [phone]  Additional Information: Leaving [facility] Monday

## 2022-08-05 ENCOUNTER — TELEPHONE (OUTPATIENT)
Dept: OBSTETRICS AND GYNECOLOGY | Facility: CLINIC | Age: 37
End: 2022-08-05
Payer: MEDICAID

## 2022-08-05 NOTE — TELEPHONE ENCOUNTER
----- Message from Alessandro Collazo sent at 8/5/2022 12:28 PM CDT -----  .Type:  Needs Medical Advice    Who Called: self  Would the patient rather a call back or a response via [facility]? Call back  Best Call Back Number: [phone]   Additional Information: patient would like to schedule a wellness appointment.

## 2022-09-14 ENCOUNTER — TELEPHONE (OUTPATIENT)
Dept: OBSTETRICS AND GYNECOLOGY | Facility: CLINIC | Age: 37
End: 2022-09-14

## 2022-09-15 NOTE — TELEPHONE ENCOUNTER
----- Message from Batool Strong sent at 9/14/2022  1:42 PM CDT -----  Regarding: speak with nurse  Type:  Patient Returning Call    Who Called:patient     Who Left Message for Patient:n/a    Does the patient know what this is regarding?:patient would like to speak with nurse concerning completed annual April 25, 2022    Would the patient rather a call back or a response via [facility]? Call back     Best Call Back Number:[phone]  Additional Information: n/A

## 2022-09-21 ENCOUNTER — PATIENT MESSAGE (OUTPATIENT)
Dept: OBSTETRICS AND GYNECOLOGY | Facility: CLINIC | Age: 37
End: 2022-09-21
Payer: MEDICAID

## 2022-09-28 NOTE — TELEPHONE ENCOUNTER
Lynn,  It looks like 10/19 is available on my schedule. Can you see if it is available in the procedure room??

## 2022-10-06 ENCOUNTER — PATIENT MESSAGE (OUTPATIENT)
Dept: OBSTETRICS AND GYNECOLOGY | Facility: CLINIC | Age: 37
End: 2022-10-06
Payer: MEDICAID

## 2022-10-19 ENCOUNTER — PROCEDURE VISIT (OUTPATIENT)
Dept: OBSTETRICS AND GYNECOLOGY | Facility: CLINIC | Age: 37
End: 2022-10-19
Payer: MEDICAID

## 2022-10-19 VITALS
WEIGHT: 170.44 LBS | SYSTOLIC BLOOD PRESSURE: 112 MMHG | BODY MASS INDEX: 4792.61 KG/M2 | DIASTOLIC BLOOD PRESSURE: 68 MMHG

## 2022-10-19 DIAGNOSIS — R87.810 CERVICAL HIGH RISK HPV (HUMAN PAPILLOMAVIRUS) TEST POSITIVE: Primary | ICD-10-CM

## 2022-10-19 LAB
B-HCG UR QL: NEGATIVE
CTP QC/QA: YES

## 2022-10-19 PROCEDURE — 88305 TISSUE EXAM BY PATHOLOGIST: ICD-10-PCS | Mod: 26,,, | Performed by: PATHOLOGY

## 2022-10-19 PROCEDURE — 88305 TISSUE EXAM BY PATHOLOGIST: CPT | Performed by: PATHOLOGY

## 2022-10-19 PROCEDURE — 57456 COLPOSCOPY: ICD-10-PCS | Mod: S$PBB,,, | Performed by: OBSTETRICS & GYNECOLOGY

## 2022-10-19 PROCEDURE — 88305 TISSUE EXAM BY PATHOLOGIST: CPT | Mod: 26,,, | Performed by: PATHOLOGY

## 2022-10-19 PROCEDURE — 99499 NO LOS: ICD-10-PCS | Mod: S$PBB,,, | Performed by: OBSTETRICS & GYNECOLOGY

## 2022-10-19 PROCEDURE — 81025 URINE PREGNANCY TEST: CPT | Mod: PBBFAC,PO | Performed by: OBSTETRICS & GYNECOLOGY

## 2022-10-19 PROCEDURE — 99499 UNLISTED E&M SERVICE: CPT | Mod: S$PBB,,, | Performed by: OBSTETRICS & GYNECOLOGY

## 2022-10-19 PROCEDURE — 57456 ENDOCERV CURETTAGE W/SCOPE: CPT | Mod: PBBFAC,PO | Performed by: OBSTETRICS & GYNECOLOGY

## 2022-10-19 NOTE — PROCEDURES
Colposcopy    Date/Time: 10/19/2022 12:45 PM  Performed by: Fanta Wang MD  Authorized by: Fanta Wang MD     Consent Done?:  Yes (Written)  Timeout:Immediately prior to procedure a time out was called to verify the correct patient, procedure, equipment, support staff and site/side marked as required  Assistants?: Yes    List of assistants:  BEE Matos was present for the entire procedure.    Colposcopy Site:  Cervix  Acrowhite Lesion: No    Atypical Vessels: No    Transformation Zone Adequate?: Yes    Biopsy?: No    ECC Performed?: Yes    LEEP Performed?: No     Patient tolerated the procedure well with no immediate complications.

## 2022-10-25 LAB
FINAL PATHOLOGIC DIAGNOSIS: NORMAL
GROSS: NORMAL
Lab: NORMAL

## 2022-11-15 RX ORDER — FLUOXETINE HYDROCHLORIDE 20 MG/1
CAPSULE ORAL
Qty: 90 CAPSULE | Refills: 2 | OUTPATIENT
Start: 2022-11-15

## 2022-12-12 NOTE — TELEPHONE ENCOUNTER
----- Message from Cassidy Weaver sent at 3/2/2017  1:06 PM CST -----  Contact: [phone]/ self   Patient called in returning your call. Please advise     No

## 2023-04-19 ENCOUNTER — TELEPHONE (OUTPATIENT)
Dept: OBSTETRICS AND GYNECOLOGY | Facility: CLINIC | Age: 38
End: 2023-04-19

## 2023-04-19 NOTE — TELEPHONE ENCOUNTER
----- Message from Renata Centeno sent at 4/19/2023  4:45 PM CDT -----  Type:  RX Refill Request    Who Called: pt   Refill or New Rx:refill   RX Name and Strength: FLUoxetine 20 MG capsule  Preferred Pharmacy with phone number:[facility] #[id number] - [place] - [address]  AT [facility] OF [facility] [address]  Local or Mail Order:local  Ordering Provider:manpreet  Would the patient rather a call back or a response via [facility]? Call   Best Call Back Number:[phone]  Additional Information: she stated she needs a refill on something else she forgot the name of it during call

## 2023-04-20 RX ORDER — FLUOXETINE HYDROCHLORIDE 20 MG/1
20 CAPSULE ORAL DAILY
Qty: 90 CAPSULE | Refills: 0 | Status: SHIPPED | OUTPATIENT
Start: 2023-04-20 | End: 2023-06-29

## 2023-06-07 ENCOUNTER — OFFICE VISIT (OUTPATIENT)
Dept: SPORTS MEDICINE | Facility: CLINIC | Age: 38
End: 2023-06-07
Payer: MEDICAID

## 2023-06-07 ENCOUNTER — HOSPITAL ENCOUNTER (OUTPATIENT)
Dept: RADIOLOGY | Facility: HOSPITAL | Age: 38
Discharge: HOME | End: 2023-06-07
Attending: PHYSICIAN ASSISTANT
Payer: MEDICAID

## 2023-06-07 VITALS
HEIGHT: 61 IN | WEIGHT: 150 LBS | HEART RATE: 79 BPM | DIASTOLIC BLOOD PRESSURE: 74 MMHG | BODY MASS INDEX: 28.32 KG/M2 | SYSTOLIC BLOOD PRESSURE: 107 MMHG

## 2023-06-07 DIAGNOSIS — M25.539 PAIN IN WRIST, UNSPECIFIED LATERALITY: ICD-10-CM

## 2023-06-07 DIAGNOSIS — M25.531 PAIN IN BOTH WRISTS: Primary | ICD-10-CM

## 2023-06-07 DIAGNOSIS — M25.532 PAIN IN BOTH WRISTS: Primary | ICD-10-CM

## 2023-06-07 PROCEDURE — 99203 PR OFFICE/OUTPT VISIT, NEW, LEVL III, 30-44 MIN: ICD-10-PCS | Mod: S$PBB,,, | Performed by: PHYSICIAN ASSISTANT

## 2023-06-07 PROCEDURE — 3078F DIAST BP <80 MM HG: CPT | Mod: CPTII,,, | Performed by: PHYSICIAN ASSISTANT

## 2023-06-07 PROCEDURE — 1159F MED LIST DOCD IN RCRD: CPT | Mod: CPTII,,, | Performed by: PHYSICIAN ASSISTANT

## 2023-06-07 PROCEDURE — 99999 PR PBB SHADOW E&M-EST. PATIENT-LVL IV: ICD-10-PCS | Mod: PBBFAC,,, | Performed by: PHYSICIAN ASSISTANT

## 2023-06-07 PROCEDURE — 3008F BODY MASS INDEX DOCD: CPT | Mod: CPTII,,, | Performed by: PHYSICIAN ASSISTANT

## 2023-06-07 PROCEDURE — 3074F SYST BP LT 130 MM HG: CPT | Mod: CPTII,,, | Performed by: PHYSICIAN ASSISTANT

## 2023-06-07 PROCEDURE — 1159F PR MEDICATION LIST DOCUMENTED IN MEDICAL RECORD: ICD-10-PCS | Mod: CPTII,,, | Performed by: PHYSICIAN ASSISTANT

## 2023-06-07 PROCEDURE — 99214 OFFICE O/P EST MOD 30 MIN: CPT | Mod: PBBFAC | Performed by: PHYSICIAN ASSISTANT

## 2023-06-07 PROCEDURE — 99999 PR PBB SHADOW E&M-EST. PATIENT-LVL IV: CPT | Mod: PBBFAC,,, | Performed by: PHYSICIAN ASSISTANT

## 2023-06-07 PROCEDURE — 73110 XR WRIST COMPLETE 3 VIEWS BILATERAL: ICD-10-PCS | Mod: 26,50,, | Performed by: RADIOLOGY

## 2023-06-07 PROCEDURE — 73110 X-RAY EXAM OF WRIST: CPT | Mod: 26,50,, | Performed by: RADIOLOGY

## 2023-06-07 PROCEDURE — 73110 X-RAY EXAM OF WRIST: CPT | Mod: TC,50

## 2023-06-07 PROCEDURE — 3078F PR MOST RECENT DIASTOLIC BLOOD PRESSURE < 80 MM HG: ICD-10-PCS | Mod: CPTII,,, | Performed by: PHYSICIAN ASSISTANT

## 2023-06-07 PROCEDURE — 3008F PR BODY MASS INDEX (BMI) DOCUMENTED: ICD-10-PCS | Mod: CPTII,,, | Performed by: PHYSICIAN ASSISTANT

## 2023-06-07 PROCEDURE — 99203 OFFICE O/P NEW LOW 30 MIN: CPT | Mod: S$PBB,,, | Performed by: PHYSICIAN ASSISTANT

## 2023-06-07 PROCEDURE — 3074F PR MOST RECENT SYSTOLIC BLOOD PRESSURE < 130 MM HG: ICD-10-PCS | Mod: CPTII,,, | Performed by: PHYSICIAN ASSISTANT

## 2023-06-07 PROCEDURE — 1160F RVW MEDS BY RX/DR IN RCRD: CPT | Mod: CPTII,,, | Performed by: PHYSICIAN ASSISTANT

## 2023-06-07 PROCEDURE — 1160F PR REVIEW ALL MEDS BY PRESCRIBER/CLIN PHARMACIST DOCUMENTED: ICD-10-PCS | Mod: CPTII,,, | Performed by: PHYSICIAN ASSISTANT

## 2023-06-07 RX ORDER — MELOXICAM 15 MG/1
15 TABLET ORAL DAILY
Qty: 30 TABLET | Refills: 2 | Status: SHIPPED | OUTPATIENT
Start: 2023-06-07

## 2023-06-07 NOTE — PROGRESS NOTES
Subjective:     Chief Complaint: Deirdre Rodriguez is a 37 y.o. female who had concerns including Pain of the Right Wrist.    Deirdre Rodriguez is a left handed former  7 months ago now types as .. The pain started 2 months ago with no BRENDA and is becoming progressively worse after lifting herself out of a tub. Pain is located over (points to) dorsal wrist. She reports that the pain is a 0 /10 sore and aching pain today. Has not tried any other treatments other than NSAIDs. Pain not responding adequately to conservative measures which have included activity modifications, rest, and oral medication. Is affecting ADLs and limiting desired level of activity. Denies numbness, tingling, radiation, and neck pain or radicular symptoms.  Pain is 8 /10 at its worst    Mechanical symptoms: none  Subjective instability: (--)   Worse with no specific movements  Better with rest.   Nocturnal symptoms: (+)    No previous surgeries or trauma on wrist      Review of Systems   Constitutional: Negative for chills and fever.   HENT:  Negative for congestion and sore throat.    Eyes:  Negative for discharge and double vision.   Cardiovascular:  Negative for chest pain, palpitations and syncope.   Respiratory:  Negative for cough and shortness of breath.    Endocrine: Negative for cold intolerance and heat intolerance.   Skin:  Negative for dry skin and rash.   Musculoskeletal:  Positive for joint pain.   Gastrointestinal:  Negative for abdominal pain, nausea and vomiting.   Neurological:  Negative for focal weakness, numbness and paresthesias.               Objective:     General: Deirdre is well-developed, well-nourished, appears stated age, in no acute distress, alert and oriented to time, place and person.     General    Nursing note and vitals reviewed.  Constitutional: She is oriented to person, place, and time. She appears well-developed and well-nourished. No distress.   HENT:   Head: Normocephalic and  atraumatic.   Right Ear: External ear normal.   Left Ear: External ear normal.   Nose: Nose normal.   Eyes: Pupils are equal, round, and reactive to light.   Neck: Neck supple.   Cardiovascular:  Normal heart sounds and intact distal pulses.            Pulmonary/Chest: Effort normal and breath sounds normal. No respiratory distress.   Abdominal: Soft. Bowel sounds are normal.   Neurological: She is alert and oriented to person, place, and time. No cranial nerve deficit. Coordination normal.   Psychiatric: She has a normal mood and affect. Her behavior is normal. Judgment and thought content normal.             Right Hand/Wrist Exam     Inspection   Scars:  Hand -  absent  Effusion:  Hand -  absent  Bruising:  Hand -  absent  Deformity:  Hand -  deformity    Tenderness   The patient is tender to palpation of the snuff box.    Range of Motion     Wrist   Extension:  normal   Flexion:  normal   Pronation:  normal   Supination:  normal     Tests   Phalens Sign: negative  Finkelstein's test: negative    Atrophy   Thenar:  negative  Hypothenar:  negative    Other     Neuorologic Exam    Median Distribution: normal  Ulnar Distribution: normal  Radial Distribution: normal      Left Hand/Wrist Exam     Inspection   Scars:  Hand -  absent  Effusion:  Hand -  absent  Bruising:  Hand -  absent  Deformity:  Hand -  absent    Range of Motion     Wrist   Extension:  normal   Flexion:  normal   Pronation:  normal   Supination:  normal     Tests   Phalens Sign: negative  Finkelstein's test: negative    Atrophy  Thenar:  Negative  Hypothenar:  negative    Other     Sensory Exam  Median Distribution: normal  Ulnar Distribution: normal  Radial Distribution: normal          Muscle Strength   Right Upper Extremity   Wrist extension: 5/5   Wrist flexion: 5/5   : 5/5   Thumb - APB: 5/5  Thumb - FPL: 5/5    Vascular Exam       Capillary Refill  Right Hand: normal capillary refill  Left Hand: normal capillary refill        Radiographic  findings today:    Right wrist:     No fracture or malalignment.  Preserved bone density and joint spaces.  No opaque soft tissue foreign body.  Question soft tissue swelling dorsally at the level of the metacarpals.     Left wrist:     No fracture or malalignment.  Preserved bone density and joint spaces.  No opaque soft tissue foreign body.  Question soft tissue swelling dorsally at the level of the metacarpals.    Xrays of the right wrist and left wrist were ordered and reviewed by me today. These findings were discussed and reviewed with the patient.    Assessment:     Encounter Diagnosis   Name Primary?    Pain in both wrists Yes        Plan:     We have discussed a variety of treatment options including medications, injections, physical therapy and other alternative treatments. I also explained the indications, risks and benefits of surgery.  Recommending conservative treatment at this time.  Also recommending physical therapy.  Patient agrees with treatment plan.    1. Mobic 15 mg 1 time daily PRN for pain management. Patient understands to take with food and/or OTC prilosec to decrease GI side effects.  2. Ambulatory referral to hand therapy.  3. RICE - Ice compress to the affected area 2-3x a day for 15-20 minutes as needed for pain management.  4. RTC to see Shoaib Mosquera PA-C as needed for follow-up.    All of the patient's questions were answered and the patient will contact us if they have any questions or concerns in the interim.        Patient questionnaires may have been collected.

## 2023-06-09 ENCOUNTER — OFFICE VISIT (OUTPATIENT)
Dept: OBSTETRICS AND GYNECOLOGY | Facility: CLINIC | Age: 38
End: 2023-06-09
Payer: MEDICAID

## 2023-06-09 VITALS
SYSTOLIC BLOOD PRESSURE: 118 MMHG | WEIGHT: 169.75 LBS | BODY MASS INDEX: 32.07 KG/M2 | DIASTOLIC BLOOD PRESSURE: 80 MMHG

## 2023-06-09 DIAGNOSIS — Z01.419 WELL WOMAN EXAM WITH ROUTINE GYNECOLOGICAL EXAM: Primary | ICD-10-CM

## 2023-06-09 DIAGNOSIS — Z11.3 SCREEN FOR STD (SEXUALLY TRANSMITTED DISEASE): ICD-10-CM

## 2023-06-09 DIAGNOSIS — R87.810 CERVICAL HIGH RISK HPV (HUMAN PAPILLOMAVIRUS) TEST POSITIVE: ICD-10-CM

## 2023-06-09 DIAGNOSIS — Z12.4 ROUTINE CERVICAL SMEAR: ICD-10-CM

## 2023-06-09 PROCEDURE — 3008F BODY MASS INDEX DOCD: CPT | Mod: CPTII,,, | Performed by: OBSTETRICS & GYNECOLOGY

## 2023-06-09 PROCEDURE — 99213 OFFICE O/P EST LOW 20 MIN: CPT | Mod: PBBFAC,PO | Performed by: OBSTETRICS & GYNECOLOGY

## 2023-06-09 PROCEDURE — 87591 N.GONORRHOEAE DNA AMP PROB: CPT | Performed by: OBSTETRICS & GYNECOLOGY

## 2023-06-09 PROCEDURE — 99395 PREV VISIT EST AGE 18-39: CPT | Mod: S$PBB,,, | Performed by: OBSTETRICS & GYNECOLOGY

## 2023-06-09 PROCEDURE — 1160F RVW MEDS BY RX/DR IN RCRD: CPT | Mod: CPTII,,, | Performed by: OBSTETRICS & GYNECOLOGY

## 2023-06-09 PROCEDURE — 3074F PR MOST RECENT SYSTOLIC BLOOD PRESSURE < 130 MM HG: ICD-10-PCS | Mod: CPTII,,, | Performed by: OBSTETRICS & GYNECOLOGY

## 2023-06-09 PROCEDURE — 3079F DIAST BP 80-89 MM HG: CPT | Mod: CPTII,,, | Performed by: OBSTETRICS & GYNECOLOGY

## 2023-06-09 PROCEDURE — 1160F PR REVIEW ALL MEDS BY PRESCRIBER/CLIN PHARMACIST DOCUMENTED: ICD-10-PCS | Mod: CPTII,,, | Performed by: OBSTETRICS & GYNECOLOGY

## 2023-06-09 PROCEDURE — 1159F MED LIST DOCD IN RCRD: CPT | Mod: CPTII,,, | Performed by: OBSTETRICS & GYNECOLOGY

## 2023-06-09 PROCEDURE — 3074F SYST BP LT 130 MM HG: CPT | Mod: CPTII,,, | Performed by: OBSTETRICS & GYNECOLOGY

## 2023-06-09 PROCEDURE — 87624 HPV HI-RISK TYP POOLED RSLT: CPT | Performed by: OBSTETRICS & GYNECOLOGY

## 2023-06-09 PROCEDURE — 99999 PR PBB SHADOW E&M-EST. PATIENT-LVL III: ICD-10-PCS | Mod: PBBFAC,,, | Performed by: OBSTETRICS & GYNECOLOGY

## 2023-06-09 PROCEDURE — 81514 NFCT DS BV&VAGINITIS DNA ALG: CPT | Performed by: OBSTETRICS & GYNECOLOGY

## 2023-06-09 PROCEDURE — 1159F PR MEDICATION LIST DOCUMENTED IN MEDICAL RECORD: ICD-10-PCS | Mod: CPTII,,, | Performed by: OBSTETRICS & GYNECOLOGY

## 2023-06-09 PROCEDURE — 99395 PR PREVENTIVE VISIT,EST,18-39: ICD-10-PCS | Mod: S$PBB,,, | Performed by: OBSTETRICS & GYNECOLOGY

## 2023-06-09 PROCEDURE — 99999 PR PBB SHADOW E&M-EST. PATIENT-LVL III: CPT | Mod: PBBFAC,,, | Performed by: OBSTETRICS & GYNECOLOGY

## 2023-06-09 PROCEDURE — 88175 CYTOPATH C/V AUTO FLUID REDO: CPT | Performed by: OBSTETRICS & GYNECOLOGY

## 2023-06-09 PROCEDURE — 3079F PR MOST RECENT DIASTOLIC BLOOD PRESSURE 80-89 MM HG: ICD-10-PCS | Mod: CPTII,,, | Performed by: OBSTETRICS & GYNECOLOGY

## 2023-06-09 PROCEDURE — 3008F PR BODY MASS INDEX (BMI) DOCUMENTED: ICD-10-PCS | Mod: CPTII,,, | Performed by: OBSTETRICS & GYNECOLOGY

## 2023-06-09 RX ORDER — METHYLPREDNISOLONE 4 MG/1
TABLET ORAL
COMMUNITY
Start: 2023-03-29 | End: 2024-01-22

## 2023-06-09 RX ORDER — CLOTRIMAZOLE 1 %
CREAM (GRAM) TOPICAL 2 TIMES DAILY
COMMUNITY
Start: 2023-05-31 | End: 2024-01-22 | Stop reason: SDUPTHER

## 2023-06-09 RX ORDER — KETOCONAZOLE 20 MG/ML
SHAMPOO, SUSPENSION TOPICAL
COMMUNITY
Start: 2023-05-31

## 2023-06-09 RX ORDER — DOXYCYCLINE 100 MG/1
100 CAPSULE ORAL
COMMUNITY
Start: 2023-05-31

## 2023-06-09 NOTE — PROGRESS NOTES
OBSTETRIC HISTORY:   OB History          3    Para   2    Term   2            AB   1    Living   2         SAB        IAB   1    Ectopic        Multiple        Live Births   2                COMPREHENSIVE GYN HISTORY:  PAP History: Reports abnormal Paps. +HRHPV 2020  Infection History: Reports STDs: Herpes. Denies PID.  Benign History: Denies uterine fibroids. Denies ovarian cysts. Denies endometriosis.   Cancer History: Denies cervical cancer. Denies uterine cancer or hyperplasia. Denies ovarian cancer. Denies vulvar cancer or pre-cancer. Denies vaginal cancer or pre-cancer. Denies breast cancer. Denies colon cancer.  Sexual Activity History:  reports that she currently engages in sexual activity. She reports using the following method of birth control/protection: Implant.   Menstrual History:  Every 28 days, flows for 3 days. Moderate flow.  Dysmenorrhea History: Denies dysmenorrhea.  Contraception: Nexplanon  Inserted 18         HPI:   37 y.o.  Patient's last menstrual period was 2023.   Patient is  here for her annual gynecologic exam and abnormal Pap follow up.  She has no GYN complaints. She denies bladder, breast and bowel complaints.    ROS:  GENERAL: Denies weight gain or weight loss. Feeling well overall.   SKIN: Denies rash or lesions.   HEAD: Denies headache.   NODES: Denies enlarged lymph nodes.   CHEST: Denies shortness of breath.   ABDOMEN: No abdominal pain, constipation, diarrhea, nausea, vomiting or rectal bleeding.   URINARY: No frequency, dysuria, hematuria, or burning on urination.  REPRODUCTIVE: See HPI.   BREASTS: The patient denies pain, lumps, or nipple discharge.   HEMATOLOGIC: No easy bruisability.   MUSCULOSKELETAL: Denies joint pain or back pain.   NEUROLOGIC: Denies weakness.   PSYCHIATRIC: Denies depression, anxiety or mood swings.    PE:   /80   Wt 77 kg (169 lb 12.1 oz)   LMP 2023   BMI 32.07 kg/m²   APPEARANCE: Well nourished, well developed,  in no acute distress.  NECK: Neck symmetric without  thyromegaly.  NODES: No inguinal, cervical lymph node enlargement.  CHEST: Lungs clear to auscultation.  HEART: Regular rate and rhythm, no murmurs, rubs or gallops.  ABDOMEN: Soft. No tenderness or masses. No hernias.  BREASTS: Symmetrical, no skin changes or visible lesions. No palpable masses, nipple discharge or adenopathy bilaterally.  PELVIC:   VULVA: No lesions. Normal female genitalia.  URETHRAL MEATUS: Normal size and location, no lesions, no prolapse.  URETHRA: No masses, tenderness, prolapse or scarring.  VAGINA: Moist and well rugated, no discharge, no significant cystocele or rectocele.  CERVIX: No lesions and discharge.  UTERUS: Normal size, regular shape, mobile, non-tender, bladder base nontender.  ADNEXA: No masses or tenderness.    PROCEDURES:  Pap smear  HRHPV  Affirm/GC/CT    Assessment:  Normal Gynecologic Exam  Screen STD  History of abnormal pap smears    Plan:  Mammogram and Colonoscopy if indicated by current recommendations.  Return to clinic in one year or for any problems or complaints.    Counseling:  Patient was counseled today on A.C.S. Pap guidelines and recommendations for yearly pelvic exams and monthly self breast exams; to see her PCP for other health maintenance. Regular exercise and healthy diet.     As of April 1, 2021, the Cures Act has been passed nationally. This new law requires that all doctors progress notes, lab results, pathology reports and radiology reports be released IMMEDIATELY to the patient in the patient portal. That means that the results are released to you at the EXACT same time they are released to me. Therefore, with all of the patients that I have I am not able to reply to each patient exactly when the results come in. So there will be a delay from when you see the results to when I see them and have time to come up with a response to send you. Also I only see these results when I am on the computer at  work. So if the results come in over the weekend or after 5 pm of a work day, I will not see them until the next business day. As you can tell, this is a challenge as a physician to give every patient the quick response they hope for and deserve. So please be patient!     Thanks for understanding,

## 2023-06-12 LAB
BACTERIAL VAGINOSIS DNA: NEGATIVE
C TRACH DNA SPEC QL NAA+PROBE: NOT DETECTED
CANDIDA GLABRATA DNA: NEGATIVE
CANDIDA KRUSEI DNA: NEGATIVE
CANDIDA RRNA VAG QL PROBE: NEGATIVE
N GONORRHOEA DNA SPEC QL NAA+PROBE: NOT DETECTED
T VAGINALIS RRNA GENITAL QL PROBE: NEGATIVE

## 2023-06-17 LAB
FINAL PATHOLOGIC DIAGNOSIS: NORMAL
Lab: NORMAL

## 2023-06-19 LAB
HPV HR 12 DNA SPEC QL NAA+PROBE: NEGATIVE
HPV16 AG SPEC QL: NEGATIVE
HPV18 DNA SPEC QL NAA+PROBE: NEGATIVE

## 2023-06-20 ENCOUNTER — CLINICAL SUPPORT (OUTPATIENT)
Dept: REHABILITATION | Facility: HOSPITAL | Age: 38
End: 2023-06-20
Payer: MEDICAID

## 2023-06-20 DIAGNOSIS — M25.532 BILATERAL WRIST PAIN: ICD-10-CM

## 2023-06-20 DIAGNOSIS — Z78.9 DIFFICULTY WITH ACTIVITIES OF DAILY LIVING: ICD-10-CM

## 2023-06-20 DIAGNOSIS — M25.531 BILATERAL WRIST PAIN: ICD-10-CM

## 2023-06-20 DIAGNOSIS — M25.531 PAIN IN BOTH WRISTS: ICD-10-CM

## 2023-06-20 DIAGNOSIS — M25.532 PAIN IN BOTH WRISTS: ICD-10-CM

## 2023-06-20 PROCEDURE — 97165 OT EVAL LOW COMPLEX 30 MIN: CPT

## 2023-06-20 PROCEDURE — 97530 THERAPEUTIC ACTIVITIES: CPT

## 2023-06-20 NOTE — PATIENT INSTRUCTIONS
[facility] - Home Exercises       Do this routine 1-2 x/day :    1) Heat  (5-10 mins)  2) Massage (5 mins)  3) Stretch  (hold thumb down for 10 seconds x 3)  4) Ice (5-10 mins)                  BENJIE Barney/SILVIA

## 2023-06-21 PROBLEM — M25.531 BILATERAL WRIST PAIN: Status: ACTIVE | Noted: 2023-06-21

## 2023-06-21 PROBLEM — M25.532 BILATERAL WRIST PAIN: Status: ACTIVE | Noted: 2023-06-21

## 2023-06-21 PROBLEM — Z78.9 DIFFICULTY WITH ACTIVITIES OF DAILY LIVING: Status: ACTIVE | Noted: 2023-06-21

## 2023-06-21 NOTE — PLAN OF CARE
[facility] Occupational Therapy  Initial Evaluation     Date: 6/20/2023  Patient:   Chart Number: [id number]  Referring Physician: Georges Mosquera, *  Therapy Diagnosis:   1. Pain in both wrists  Ambulatory referral/consult to Physical/Occupational Therapy      2. Bilateral wrist pain        3. Difficulty with activities of daily living            Physician Orders: Eval an treat   Medical Diagnosis: M25.531,M25.532 (ICD-10-CM) - Pain in both wrists  Evaluation Date: 6/20/2023  Plan of Care Certification Date: 9/15/23  Authorization Period: 6/7/23 - 6/6/2  Surgery Date and Procedure: N/A  (Conservative Treatment) Date of Injury:   N/A B wrist pain spontaneously, no traumatic event   Date of Return to MD: SERGIO  FOTO Completion:  needs FOTO    Visit #: 1 of 1  Time In: 4:00 pm   Time Out: 5:00 pm     Total Billable Time: 60 mins    Precautions: Standard    Subjective     History of Current Condition: She reports pain started spontaneously ~2 months ago. 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 Reports both wrists hurt, but right more than left. Reports left wrist has only hurt 2 or 3 instances. Spontaneous, no mechanism of injury. She presents to OT today with main c/o of: pain when pushing up from a chair, carrying groceries, typing, pain first thing in the morning. The pain radial side wrist that radiates up thumb.     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    Deirdre Rodriguez  has a past surgical history that includes  section; Renal Stent Placement (2012); Renal Stent Removal (10/2012); and Cold knife conization of cervix (N/A, 2021).    Deirdre has a current medication list which includes the following prescription(s): clotrimazole, dicyclomine, doxycycline, nexplanon, fluoxetine, ketoconazole, meloxicam, methylprednisolone, and valacyclovir.    Review of patient's allergies indicates:  No Known Allergies       Objective     Mental status: alert, oriented x3    Observation:   She arrives without orthotics.       Wound Assessment:   N/A      Range of Motion:     Active/Passive  Shoulder Left  2023 Right  2023   Extension     Flexion     Abduction     Internal Rotation     External Rotation         Elbow and Wrist ROM. Within normal limits     Hand ROM. Measured in degrees.   2023             Thumb: MP 50 40                IP 72 60       Rad ADD/ABD 50 50       Pal ADD/ABD 50 40          Opposition To dpc  To dpc        Strength: (DIMITRIS Dynamometer in psi.)      2023    Left Right   Rung II nt nt       Pinch Strength (Measured in psi)     2023    Left Right   Key Pinch nt  nt    3pt Pinch nt  nt    2pt Pinch nt  nt        Fine Motor Coordination: 9 Hole Peg Test  Left 2023 Right 2023           Manual Muscle Test   2023        Wrist Extension      Wrist  Flexion     Radial Deviation     Ulnar Deviation     Supination     Pronation     Elbow Extension     Elbow Flexion         Special Tests (right hand) 6/20/23:  What test  +  Finklestin  +  Clamp sign -   Thumb CMC grind test  -   ECRB against resistance -  Palpation over the radial styloid +      Limitation/Restriction for FOTO Survey    Therapist reviewed FOTO scores for Deirdre Rodriguez on 6/20/2023.   FOTO documents entered into [employer] - see Media section.    Limitation Score: NEEDS FOTO         Treatment     Treatment Time In: 4:30 pm   Treatment Time Out: 5:00 pm   Total Treatment time separate from Evaluation time: 30 minutes     MEDICAID - TA ONLY     Deirdre participated in dynamic functional therapeutic activities to improve functional performance for 30 minutes, including:  - Paraffin and MH pack to right hand   - performed wrist special tests   - discussed obtaining forearm based thumb spica - order a pre-grupo one or custom (sent message to Shoaib DELANEY to ask for splint order)  - applied KT tape (inhibition strip over APL and EPB and decompression strip over 1st dorsal compartment)   - Biofreeze to 1st dorsal compartment over KT tape   - Introduced home exercise program and checked for correct performance  - Introduced and instructed on massage and STM - checked for correct performance  - Review precautions - no lifting with wrists in ulnar deviation   - Use of hot and cold modalities        Patient Education and Home Exercises     Education provided:   - Role of OT, HEP, and POC    Written Home Exercises Provided: yes.  Exercises were reviewed and Deirdre was able to demonstrate them prior to the end of the session.  Deirdre demonstrated good  understanding of the education provided. See EMR under Patient Instructions for exercises provided during therapy sessions.     Pt was advised to perform these exercises free of pain, and to stop performing them if pain occurs.    Patient/Family Education: role of OT,  goals for OT, scheduling/cancellations - pt verbalized understanding. Discussed insurance limitations with patient.      Assessment     Deirdre Rodriguez is a 38 y.o. female referred to outpatient occupational therapy and presents with the following therapy deficits: Decreased functional hand use and Increased pain and demonstrates limitations as described in the chart below. Finkelstein and WHAT tests positive (provocative testing for DeQuervains).     Following medical record review it is determined that pt will benefit from occupational therapy services in order to maximize pain free and/or functional use of bilateral (right worse) hand. The following goals were discussed with the patient and patient is in agreement with them as to be addressed in the treatment plan. The patient's rehab potential is Good.     Anticipated barriers to occupational therapy: none   Pt has no cultural, educational or language barriers to learning provided.    Profile and History Assessment of Occupational Performance Level of Clinical Decision Making Complexity Score   Occupational Profile:   Deirdre Rodriguez is a 38 y.o. female who is currently employed Deirdre Rodriguez has difficulty with  ADLs and IADLs as listed previously, which  affecting his/her daily functional abilities.      Comorbidities:    has a past medical history of Cervical high risk HPV (human papillomavirus) test positive, Herpes simplex without mention of complication, and Kidney stones.    Medical and Therapy History Review:   Expanded               Performance Deficits    Physical:  Pain  Decreased functional use     Cognitive:  No Deficits    Psychosocial:    Habits  Routines  Rituals     Clinical Decision Making:  low    Assessment Process:  Problem-Focused Assessments    Modification/Need for Assistance:  Not Necessary    Intervention Selection:  Multiple Treatment Options       low  Based on PMHX, co morbidities , data from assessments and  functional level of assistance required with task and clinical presentation directly impacting function.         Goals:    LTG's (12 weeks):  1)   Decrease complaints of pain to  1 out of 10 at worst to increase functional hand use for ADL/work/leisure activities.  2)   Patient to score at 20% or less on Quick DASH to demonstrate improved perception of functional right upper extremity use.  3)   Pt will return to near to prior level of function for ADLs and household management reporting I or Mod I with ADLs         STG's (4 weeks)  1)   Patient to be IND with HEP and modalities for pain/edema managment.  2)   Patient will demonstrate independence with self performing soft tissue mobilization to promote healing   3)   Patient will obtain a right wrist forearm based thumb spica  4)   Pt to be IND with Orthotic use, wear and care precautions.   5)   Decrease complaints of pain to  4 out of 10 at worst to increase functional hand use for ADL/work/leisure activities.          Plan     Pt to be treated by Occupational Therapy 1 times per week for 12 weeks during the certification period from 6/20/2023 to 8/8/23 to achieve the established goals.     Treatment to include: Paraffin, Fluidotherapy, Manual therapy/joint mobilizations, Modalities for pain management, US 3 mhz, Therapeutic exercises/activities., Iontophoresis with 2.0 cc Dexamethasone, Strengthening, Orthotic Fabrication/Fit/Training, Edema Control, Scar Management, Wound Care, Electrical Modalities, Joint Protection, and Energy Conservation, as well as any other treatments deemed necessary based on the patient's needs or progress.       Paula Posada OTR/L

## 2023-06-23 ENCOUNTER — TELEPHONE (OUTPATIENT)
Dept: SPORTS MEDICINE | Facility: CLINIC | Age: 38
End: 2023-06-23
Payer: MEDICAID

## 2023-06-23 DIAGNOSIS — M25.531 PAIN IN BOTH WRISTS: Primary | ICD-10-CM

## 2023-06-23 DIAGNOSIS — M25.532 PAIN IN BOTH WRISTS: Primary | ICD-10-CM

## 2023-06-23 NOTE — TELEPHONE ENCOUNTER
----- Message from Paula Posada OT sent at 6/20/2023  4:47 PM CDT -----  Regarding: custom thumb brace?  Cain Cramer,     I'm Paula the OT that's been seeing some of your patients, thanks for the referrals!    I did some special tests and I'm suspecting some De Quervain's. I think she would benefit from a custom made long arm thumb spica. What are your thoughts on this? If you agree, can you put in a referral for a custom splint?    Thanks,  Paula Posada, OTR/L

## 2023-06-29 RX ORDER — FLUOXETINE HYDROCHLORIDE 20 MG/1
CAPSULE ORAL
Qty: 90 CAPSULE | Refills: 3 | Status: SHIPPED | OUTPATIENT
Start: 2023-06-29 | End: 2024-01-22 | Stop reason: SDUPTHER

## 2023-06-29 RX ORDER — VALACYCLOVIR HYDROCHLORIDE 500 MG/1
TABLET, FILM COATED ORAL
Qty: 90 TABLET | Refills: 3 | Status: SHIPPED | OUTPATIENT
Start: 2023-06-29 | End: 2024-01-22 | Stop reason: SDUPTHER

## 2023-07-20 ENCOUNTER — CLINICAL SUPPORT (OUTPATIENT)
Dept: REHABILITATION | Facility: HOSPITAL | Age: 38
End: 2023-07-20
Payer: MEDICAID

## 2023-07-20 DIAGNOSIS — M25.531 BILATERAL WRIST PAIN: Primary | ICD-10-CM

## 2023-07-20 DIAGNOSIS — Z78.9 DIFFICULTY WITH ACTIVITIES OF DAILY LIVING: ICD-10-CM

## 2023-07-20 DIAGNOSIS — M25.532 BILATERAL WRIST PAIN: Primary | ICD-10-CM

## 2023-07-20 PROCEDURE — 97530 THERAPEUTIC ACTIVITIES: CPT

## 2023-07-20 PROCEDURE — L3806 WHFO W/JOINT(S) CUSTOM FAB: HCPCS

## 2023-07-20 NOTE — PROGRESS NOTES
[facility]  Occupational Therapy Treatment Note    Date: 7/20/2023  Name: Deirdre Gamble [facility]  Clinic Number: [id number]    Therapy Diagnosis:   Encounter Diagnoses   Name Primary?    Bilateral wrist pain Yes    Difficulty with activities of daily living      Physician: Georges Mosquera, *    Physician Orders: evaluation and treatment  Medical Diagnosis: M25.531,M25.532 (ICD-10-CM) - Pain in both wrists  Evaluation Date: 6/20/2023  Plan of Care Certification Date: 9/15/23  Authorization Period: 6/7/23 - 9/11/2023  Surgery Date and Procedure: N/A  (Conservative Treatment) Date of Injury:   N/A B wrist pain spontaneously, no traumatic event   Date of Return to MD: TBD  FOTO Completion:  46% limitation 6/20/2023    Precautions:  Standard    Time In: 4:10  Time Out: 5:05  Total Billable Time: 15 min treatment , additional time spent on orthosis fabrication which is included in the L code, 10 min with hot pack which is non-billable      SUBJECTIVE     Pt reports: that she had increase in pain for 2 wks after last session (6/20/2023)  She was not compliant with home exercise program given last session due to pain. Stated that the r wrist is the one that bothers her the most and that the L is not really bothering her.  Response to previous treatment:increase in pain  Functional change: Nothing    Pain: NT/10  Location: bilateral wrist R>L    OBJECTIVE   Objective Measures updated at progress report unless specified.     Obvious abnormalities noted.      Treatment     Deirdre received the treatments listed below:     Therapeutic Activities:55 min    Hot Pack - B wrist x 10 min (Not included in billed time.    wrist flex, wrist extension, rad/abd wrist, thumb rad abd, pal abd, circumduction ea way, thumb pinky slide x 10 reps ea    Orthosis fabrication: Charges Billed/#  Custom Long Thumb Spica Orthosis x 1 - Custom thumb spica orthosis was fabricated as ordered by physician     Pt   was instructed to wear orthosis as much as possible when using  the right hand to decrease pain. Patient was provided written instructions on orthosis purpose, wear schedule, care and precautions to monitor for increased pain/edema, pressure points, skin breakdown or redness/skin irritation Patient was instructed to contact clinic for adjustments as needed.  Patient signed copy of orthosis instructions, acknowledging delivery and understanding of wear, care, and precautions.   (see Media for scanned documents)  Goal of orthosis: rest the inflamed tendons in the 1st dorsal compartment to decrease inflammation and decrease pain to promote increase in pain-free use of the right hand and and thumb       Patient Education and Home Exercises      Education provided:   - HEP and orthosis wear/care/precautions  - Progress towards goals     Written Home Exercises Provided: yes.  Exercises were reviewed and Deirdre was able to demonstrate them prior to the end of the session.  Deirdre demonstrated good  understanding of the HEP provided. See EMR under Patient Instructions for exercises provided during therapy sessions.      ASSESSMENT      Pt has not returned to therapy since the initial evaluation for unclear reason.    Deirdre is not progressing well towards her goals and there are no updates to goals at this time. Pt prognosis is Good.     Pt will continue to benefit from skilled outpatient occupational therapy to address the deficits listed in the problem list on initial evaluation, provide pt/family education and to maximize pt's level of independence in the home and community environment.     Pt's spiritual, cultural and educational needs considered and pt agreeable to plan of care and goals.    Anticipated barriers to occupational therapy: None    Goals:    LTG's (12 weeks):  1)   Decrease complaints of pain to  1 out of 10 at worst to increase functional hand use for ADL/work/leisure activities.  2)   Patient to score at  20% or less on Quick DASH to demonstrate improved perception of functional right upper extremity use.  3)   Pt will return to near to prior level of function for ADLs and household management reporting I or Mod I with ADLs         STG's (4 weeks)  1)   Patient to be IND with HEP and modalities for pain/edema managment.  2)   Patient will demonstrate independence with self performing soft tissue mobilization to promote healing   3)   Patient will obtain a right wrist forearm based thumb spica  4)   Pt to be IND with Orthotic use, wear and care precautions. (MET 7/21/2023)   Goal of orthosis: rest the inflamed tendons in the 1st dorsal compartment to decrease inflammation and decrease pain to promote increase in pain-free use of the right hand and and thumb  5)   Decrease complaints of pain to  4 out of 10 at worst to increase functional hand use for ADL/work/leisure activities.    PLAN     Continue skilled occupational therapy with individualized plan of care focusing on decreasing pain and increase in function    Updates/Grading for next session: Add US and have pt complete a second FOTO.    Odilia Mejia, OT

## 2023-07-20 NOTE — PATIENT INSTRUCTIONS
[facility], OCCUPATIONAL THERAPY  HOME EXERCISE PROGRAM                   Complete 10 repetitions of each exercise 4 times a day in a pain free range.                   [facility] Occupational Therapy  Orthosis Instructions and Proof of Delivery        Date: 7/20/2023  Name: Deirdre Rodriguez  MRN: [id number]  YOB: 1985  Referring Provider: Georges Mosquera, *  Diagnosis: M25.531,M25.532 (ICD-10-CM) - Pain in both wrists      [facility] Level II Code and Description      Orthosis Information  () Custom Fabricated                () Right                                                                         () Static                                                            Orthosis Instructions  () Wear the orthosis as needed to minimize your symptoms      Cleaning and Maintenance  Keep your orthosis away from any heat sources. Do not leave in your car.  Keep your orthosis away from pets.  You may clean your orthosis with cool water and soap or a mild cleanser.  Your orthosis may need adjustments due to changes in your medical condition (swelling, dressing size, additional surgery, etc.)  Monitor your skin color and integrity.  Do not try to adjust the orthosis on your own.     If you have any questions or concerns, please contact the therapy office at [phone].     I, Deirdre Rodriguez , have personally received the above described orthosis along with instructions on the wear, care, and precautions related to this orthosis. I understand that I am responsible for payment to [facility] of my deductible, coinsurance, or other portion of my charges not paid in full by my insurance plans, including any item that is not covered under the insurance plan.     Signature: _________________________________ Date: __________________    Therapist:                Copyright © I. All rights reserved.     Therapist: BENJIE Meadows/L, SHARON

## 2023-08-03 ENCOUNTER — CLINICAL SUPPORT (OUTPATIENT)
Dept: REHABILITATION | Facility: HOSPITAL | Age: 38
End: 2023-08-03
Payer: MEDICAID

## 2023-08-03 DIAGNOSIS — M25.531 BILATERAL WRIST PAIN: Primary | ICD-10-CM

## 2023-08-03 DIAGNOSIS — Z78.9 DIFFICULTY WITH ACTIVITIES OF DAILY LIVING: ICD-10-CM

## 2023-08-03 DIAGNOSIS — M25.532 BILATERAL WRIST PAIN: Primary | ICD-10-CM

## 2023-08-03 PROCEDURE — 97530 THERAPEUTIC ACTIVITIES: CPT

## 2023-08-03 NOTE — PROGRESS NOTES
"  [facility]  Occupational Therapy Treatment Note    Date: 8/3/2023  Name: Deirdre Gamble [facility]  Clinic Number: [id number]    Therapy Diagnosis:   Encounter Diagnoses   Name Primary?    Bilateral wrist pain Yes    Difficulty with activities of daily living        Physician: Georges Mosquera, *    Physician Orders: evaluation and treatment  Medical Diagnosis: M25.531,M25.532 (ICD-10-CM) - Pain in both wrists  Evaluation Date: 6/20/2023  Plan of Care Certification Date: 9/15/23  Authorization Period: 6/7/23 - 9/11/2023  Surgery Date and Procedure: N/A  (Conservative Treatment) Date of Injury:   N/A B wrist pain spontaneously, no traumatic event   Date of Return to MD: TBD  FOTO Completion:  46% limitation 6/20/2023    Precautions:  Standard    Time In:  01:30 pm   Time Out: 02:30 pm    Total Billable Time: 60    SUBJECTIVE     Pt reports: "after therapy she is in pain for a week"  She was not compliant with home exercise program given last session due to pain. Stated that the r wrist is the one that bothers her the most and that the L is not really bothering her.  Response to previous treatment:increase in pain  Functional change: Nothing    Pain: 3/10  Location: bilateral wrist R>L    OBJECTIVE   Objective Measures updated at progress report unless specified.     Obvious abnormalities noted.      Treatment       Deirdre received the treatments listed below:     Hot Pack - B wrist x 10 min      Therapeutic Activities: 35 min   Finkelstein stretch holding for 30s x 3 reps   Wrist flex/ext x 10 reps pain free   FTG x 10 reps   IP thumb jt blocks x 10 reps         Manual therapy: 15 min   Soft tissue mobilization using hawk's  to 1st/2nd dorsal compartments  Retrograde massage                        Patient Education and Home Exercises      Education provided:   - HEP and orthosis wear/care/precautions  - Progress towards goals     Written Home Exercises Provided: yes.  Exercises were " reviewed and Deirdre was able to demonstrate them prior to the end of the session.  Deirdre demonstrated good  understanding of the HEP provided. See EMR under Patient Instructions for exercises provided during therapy sessions.      ASSESSMENT     Good mariia to manual today. No pain throughout session     Deirdre is not progressing well towards her goals and there are no updates to goals at this time. Pt prognosis is Good.     Pt will continue to benefit from skilled outpatient occupational therapy to address the deficits listed in the problem list on initial evaluation, provide pt/family education and to maximize pt's level of independence in the home and community environment.     Pt's spiritual, cultural and educational needs considered and pt agreeable to plan of care and goals.    Anticipated barriers to occupational therapy: None    Goals:    LTG's (12 weeks):  1)   Decrease complaints of pain to  1 out of 10 at worst to increase functional hand use for ADL/work/leisure activities.  2)   Patient to score at 20% or less on Quick DASH to demonstrate improved perception of functional right upper extremity use.  3)   Pt will return to near to prior level of function for ADLs and household management reporting I or Mod I with ADLs         STG's (4 weeks)  1)   Patient to be IND with HEP and modalities for pain/edema managment.  2)   Patient will demonstrate independence with self performing soft tissue mobilization to promote healing   3)   Patient will obtain a right wrist forearm based thumb spica  4)   Pt to be IND with Orthotic use, wear and care precautions. (MET 7/21/2023)   Goal of orthosis: rest the inflamed tendons in the 1st dorsal compartment to decrease inflammation and decrease pain to promote increase in pain-free use of the right hand and and thumb  5)   Decrease complaints of pain to  4 out of 10 at worst to increase functional hand use for ADL/work/leisure activities.    PLAN     Continue skilled  occupational therapy with individualized plan of care focusing on decreasing pain and increase in function    Updates/Grading for next session: Add US and have pt complete a second FOTO.    Adeline Steele, OT

## 2023-08-10 ENCOUNTER — CLINICAL SUPPORT (OUTPATIENT)
Dept: REHABILITATION | Facility: HOSPITAL | Age: 38
End: 2023-08-10
Payer: MEDICAID

## 2023-08-10 DIAGNOSIS — M25.531 BILATERAL WRIST PAIN: Primary | ICD-10-CM

## 2023-08-10 DIAGNOSIS — Z78.9 DIFFICULTY WITH ACTIVITIES OF DAILY LIVING: ICD-10-CM

## 2023-08-10 DIAGNOSIS — M25.532 BILATERAL WRIST PAIN: Primary | ICD-10-CM

## 2023-08-10 PROCEDURE — 97530 THERAPEUTIC ACTIVITIES: CPT

## 2023-08-10 NOTE — PROGRESS NOTES
"  [facility]  Occupational Therapy Treatment Note    Date: 8/10/2023  Name: Deirdre Gamble [facility]  Clinic Number: [id number]    Therapy Diagnosis:   No diagnosis found.      Physician: Georges Mosquera, *    Physician Orders: evaluation and treatment  Medical Diagnosis: M25.531,M25.532 (ICD-10-CM) - Pain in both wrists  Evaluation Date: 6/20/2023  Plan of Care Certification Date: 9/15/23  Authorization Period: 6/7/23 - 9/11/2023  Surgery Date and Procedure: N/A  (Conservative Treatment) Date of Injury:   N/A B wrist pain spontaneously, no traumatic event   Date of Return to MD: TBD  FOTO Completion:  46% limitation 6/20/2023    Precautions:  Standard    Time In:  4:15 pm   Time Out: 4:55   Total Billable Time: 40 min treatment ,  SUBJECTIVE     Pt reports an increase in pain in the wrist after therapy. Rated as a 4/10. Describes as an "ache." Stated that it no longer hurts at night.  She was not compliant with home exercise program given last session due to pain. Stated that the r wrist is the one that bothers her the most and that the L is not really bothering her.  Response to previous treatment:increase in pain  Functional change: Nothing    Pain: 4/10  Location: bilateral wrist R>L    OBJECTIVE   Objective Measures updated at progress report unless specified.     Obvious abnormalities noted.      Treatment       Deirdre received the treatments listed below:     Therapeutic Activities:45min    Paraffin - B wrist x 10 min     wrist flex, wrist extension, rad/abd wrist, thumb rad abd, pal abd, circumduction ea way, thumb pinky slide x 10 reps ea  Wrist maze x 2 min  Gyro Wheel x 2 min  Jin-balance x 2 min   Pt  was instructed to continue to wear orthosis that was made last session, as much as possible when using  the right hand to decrease pain.  Pt was instructed to resume her HEP but to stop if symptoms progressively worsen.   Instructed her to schedule a f/u visit with her physician " as she does not have one set and she is not progressing.        Patient Education and Home Exercises      Education provided:   - HEP and orthosis wear/care/precautions  - Progress towards goals     Written Home Exercises Provided: yes.  Exercises were reviewed and Deirdre was able to demonstrate them prior to the end of the session.  Deirdre demonstrated good  understanding of the HEP provided. See EMR under Patient Instructions for exercises provided during therapy sessions.      ASSESSMENT      Pt reports continued pain that she finds increases with therapy, She reports non-compliance with HEP due to finding her pain increases with this. Instructed to try to schedule a f/u visit with physician due to worsening pain, however, did encourage to try to perform HEP gently and consistently to give them a real try to see if she gets any relief. If the response os continued worsening of the pain, she was instructed to stop them. She indicated pain in the snuff shelley today,    Deirdre is not progressing well towards her goals and there are no updates to goals at this time. Pt prognosis is Fair.     Pt will continue to benefit from skilled outpatient occupational therapy to address the deficits listed in the problem list on initial evaluation, provide pt/family education and to maximize pt's level of independence in the home and community environment.     Pt's spiritual, cultural and educational needs considered and pt agreeable to plan of care and goals.    Anticipated barriers to occupational therapy: None    Goals:    LTG's (12 weeks):  1)   Decrease complaints of pain to  1 out of 10 at worst to increase functional hand use for ADL/work/leisure activities.  2)   Patient to score at 20% or less on Quick DASH to demonstrate improved perception of functional right upper extremity use.  3)   Pt will return to near to prior level of function for ADLs and household management reporting I or Mod I with ADLs         STG's (4  weeks)  1)   Patient to be IND with HEP and modalities for pain/edema managment.  2)   Patient will demonstrate independence with self performing soft tissue mobilization to promote healing   3)   Patient will obtain a right wrist forearm based thumb spica  4)   Pt to be IND with Orthotic use, wear and care precautions. (MET 7/21/2023)   Goal of orthosis: rest the inflamed tendons in the 1st dorsal compartment to decrease inflammation and decrease pain to promote increase in pain-free use of the right hand and and thumb  5)   Decrease complaints of pain to  4 out of 10 at worst to increase functional hand use for ADL/work/leisure activities.    PLAN     Continue skilled occupational therapy with individualized plan of care focusing on decreasing pain and increase in function    Updates/Grading for next session: Add US and have pt complete a second FOTO.    Odilia Mejia OT

## 2023-08-21 ENCOUNTER — OFFICE VISIT (OUTPATIENT)
Dept: SPORTS MEDICINE | Facility: CLINIC | Age: 38
End: 2023-08-21
Payer: MEDICAID

## 2023-08-21 VITALS
WEIGHT: 174.81 LBS | HEART RATE: 76 BPM | DIASTOLIC BLOOD PRESSURE: 74 MMHG | BODY MASS INDEX: 33.03 KG/M2 | SYSTOLIC BLOOD PRESSURE: 119 MMHG

## 2023-08-21 DIAGNOSIS — M25.532 PAIN IN BOTH WRISTS: ICD-10-CM

## 2023-08-21 DIAGNOSIS — M65.4 DE QUERVAIN'S TENOSYNOVITIS, RIGHT: Primary | ICD-10-CM

## 2023-08-21 DIAGNOSIS — M25.531 PAIN IN BOTH WRISTS: ICD-10-CM

## 2023-08-21 PROCEDURE — 99213 OFFICE O/P EST LOW 20 MIN: CPT | Mod: PBBFAC | Performed by: PHYSICIAN ASSISTANT

## 2023-08-21 PROCEDURE — 1160F RVW MEDS BY RX/DR IN RCRD: CPT | Mod: CPTII,,, | Performed by: PHYSICIAN ASSISTANT

## 2023-08-21 PROCEDURE — 3008F PR BODY MASS INDEX (BMI) DOCUMENTED: ICD-10-PCS | Mod: CPTII,,, | Performed by: PHYSICIAN ASSISTANT

## 2023-08-21 PROCEDURE — 1160F PR REVIEW ALL MEDS BY PRESCRIBER/CLIN PHARMACIST DOCUMENTED: ICD-10-PCS | Mod: CPTII,,, | Performed by: PHYSICIAN ASSISTANT

## 2023-08-21 PROCEDURE — 1159F MED LIST DOCD IN RCRD: CPT | Mod: CPTII,,, | Performed by: PHYSICIAN ASSISTANT

## 2023-08-21 PROCEDURE — 99213 OFFICE O/P EST LOW 20 MIN: CPT | Mod: S$PBB,,, | Performed by: PHYSICIAN ASSISTANT

## 2023-08-21 PROCEDURE — 3074F SYST BP LT 130 MM HG: CPT | Mod: CPTII,,, | Performed by: PHYSICIAN ASSISTANT

## 2023-08-21 PROCEDURE — 3008F BODY MASS INDEX DOCD: CPT | Mod: CPTII,,, | Performed by: PHYSICIAN ASSISTANT

## 2023-08-21 PROCEDURE — 99213 PR OFFICE/OUTPT VISIT, EST, LEVL III, 20-29 MIN: ICD-10-PCS | Mod: S$PBB,,, | Performed by: PHYSICIAN ASSISTANT

## 2023-08-21 PROCEDURE — 3078F DIAST BP <80 MM HG: CPT | Mod: CPTII,,, | Performed by: PHYSICIAN ASSISTANT

## 2023-08-21 PROCEDURE — 1159F PR MEDICATION LIST DOCUMENTED IN MEDICAL RECORD: ICD-10-PCS | Mod: CPTII,,, | Performed by: PHYSICIAN ASSISTANT

## 2023-08-21 PROCEDURE — 3074F PR MOST RECENT SYSTOLIC BLOOD PRESSURE < 130 MM HG: ICD-10-PCS | Mod: CPTII,,, | Performed by: PHYSICIAN ASSISTANT

## 2023-08-21 PROCEDURE — 3078F PR MOST RECENT DIASTOLIC BLOOD PRESSURE < 80 MM HG: ICD-10-PCS | Mod: CPTII,,, | Performed by: PHYSICIAN ASSISTANT

## 2023-08-21 PROCEDURE — 99999 PR PBB SHADOW E&M-EST. PATIENT-LVL III: CPT | Mod: PBBFAC,,, | Performed by: PHYSICIAN ASSISTANT

## 2023-08-21 PROCEDURE — 99999 PR PBB SHADOW E&M-EST. PATIENT-LVL III: ICD-10-PCS | Mod: PBBFAC,,, | Performed by: PHYSICIAN ASSISTANT

## 2023-08-21 NOTE — PROGRESS NOTES
Subjective:     Chief Complaint: Deirdre Rodriguez is a 38 y.o. female who had concerns including Pain of the Right Wrist.    Interval Hx: Patient presents for follow-up of Pain of the Right Wrist. The patient's last visit with me was on 6/7/2023. Reports symptoms are worsening with physical therapy, but she admits shes not performing her HEP as intructed by [employer]. She is also having symptoms over radial wrist at the joint line when she extends her thumb.  Denies new injury or trauma.  Patient presents to discuss continued treatment options.    Previous HPI: Deirdre Rodriguez is a left handed former  7 months ago now types as .. The pain started 2 months ago with no BRENDA and is becoming progressively worse after lifting herself out of a tub. Pain is located over (points to) dorsal wrist. She reports that the pain is a 0 /10 sore and aching pain today. Has not tried any other treatments other than NSAIDs. Pain not responding adequately to conservative measures which have included activity modifications, rest, and oral medication. Is affecting ADLs and limiting desired level of activity. Denies numbness, tingling, radiation, and neck pain or radicular symptoms.  Pain is 8 /10 at its worst    Mechanical symptoms: none  Subjective instability: (--)   Worse with no specific movements  Better with rest.   Nocturnal symptoms: (+)    No previous surgeries or trauma on wrist        Review of Systems   Constitutional: Negative for chills and fever.   HENT:  Negative for congestion and sore throat.    Eyes:  Negative for discharge and double vision.   Cardiovascular:  Negative for chest pain, palpitations and syncope.   Respiratory:  Negative for cough and shortness of breath.    Endocrine: Negative for cold intolerance and heat intolerance.   Skin:  Negative for dry skin and rash.   Musculoskeletal:  Positive for joint pain.   Gastrointestinal:  Negative for abdominal pain, nausea and vomiting.    Neurological:  Negative for focal weakness, numbness and paresthesias.                 Objective:     General: Deirdre is well-developed, well-nourished, appears stated age, in no acute distress, alert and oriented to time, place and person.     General    Nursing note and vitals reviewed.  Constitutional: She is oriented to person, place, and time. She appears well-developed and well-nourished. No distress.   HENT:   Head: Normocephalic and atraumatic.   Right Ear: External ear normal.   Left Ear: External ear normal.   Nose: Nose normal.   Eyes: Pupils are equal, round, and reactive to light.   Neck: Neck supple.   Cardiovascular:  Normal heart sounds and intact distal pulses.            Pulmonary/Chest: Effort normal and breath sounds normal. No respiratory distress.   Abdominal: Soft. Bowel sounds are normal.   Neurological: She is alert and oriented to person, place, and time. No cranial nerve deficit. Coordination normal.   Psychiatric: She has a normal mood and affect. Her behavior is normal. Judgment and thought content normal.             Right Hand/Wrist Exam     Inspection   Scars:  Hand -  absent  Effusion:  Hand -  absent  Bruising:  Hand -  absent  Deformity:  Hand -  deformity    Tenderness   The patient is tender to palpation of the snuff box.    Range of Motion     Wrist   Extension:  normal   Flexion:  normal   Pronation:  normal   Supination:  normal     Tests   Phalens Sign: negative  Finkelstein's test: positive    Atrophy   Thenar:  negative  Hypothenar:  negative    Other     Neuorologic Exam    Median Distribution: normal  Ulnar Distribution: normal  Radial Distribution: normal      Left Hand/Wrist Exam     Inspection   Scars:  Hand -  absent  Effusion:  Hand -  absent  Bruising:  Hand -  absent  Deformity:  Hand -  absent    Range of Motion     Wrist   Extension:  normal   Flexion:  normal   Pronation:  normal   Supination:  normal     Tests   Phalens Sign: negative  Finkelstein's test:  negative    Atrophy  Thenar:  Negative  Hypothenar:  negative    Other     Sensory Exam  Median Distribution: normal  Ulnar Distribution: normal  Radial Distribution: normal          Muscle Strength   Right Upper Extremity   Wrist extension: 5/5   Wrist flexion: 5/5   : 4/5   Thumb - APB: 5/5 (+pain)  Thumb - FPL: 5/5 (+pain)    Vascular Exam       Capillary Refill  Right Hand: normal capillary refill  Left Hand: normal capillary refill          Radiographic findings today:    Right wrist:     No fracture or malalignment.  Preserved bone density and joint spaces.  No opaque soft tissue foreign body.  Question soft tissue swelling dorsally at the level of the metacarpals.     Left wrist:     No fracture or malalignment.  Preserved bone density and joint spaces.  No opaque soft tissue foreign body.  Question soft tissue swelling dorsally at the level of the metacarpals.    Xrays of the right wrist and left wrist were ordered and reviewed by me today. These findings were discussed and reviewed with the patient.    Assessment:     Encounter Diagnoses   Name Primary?    De Quervain's tenosynovitis, right Yes    Pain in both wrists         Plan:     We have discussed a variety of treatment options including medications, injections, physical therapy and other alternative treatments. I also explained the indications, risks and benefits of surgery.  Recommending conservative treatment at this time.  Also recommending physical therapy.  Patient agrees with treatment plan.    1. Continue Mobic 15 mg 1 time daily PRN for pain management. Patient understands to take with food and/or OTC prilosec to decrease GI side effects.  2. Ambulatory referral to hand therapy. De Quervain syndrome, also discussed possible CSI, patient declined CSI at this time.  3. RICE - Ice compress to the affected area 2-3x a day for 15-20 minutes as needed for pain management.  4. RTC to see Shoaib Mosquera PA-C as needed for follow-up.    All of the  patient's questions were answered and the patient will contact us if they have any questions or concerns in the interim.        Patient questionnaires may have been collected.

## 2023-08-22 ENCOUNTER — CLINICAL SUPPORT (OUTPATIENT)
Dept: REHABILITATION | Facility: HOSPITAL | Age: 38
End: 2023-08-22
Payer: MEDICAID

## 2023-08-22 DIAGNOSIS — M25.531 BILATERAL WRIST PAIN: Primary | ICD-10-CM

## 2023-08-22 DIAGNOSIS — Z78.9 DIFFICULTY WITH ACTIVITIES OF DAILY LIVING: ICD-10-CM

## 2023-08-22 DIAGNOSIS — M25.532 BILATERAL WRIST PAIN: Primary | ICD-10-CM

## 2023-08-22 PROCEDURE — 97530 THERAPEUTIC ACTIVITIES: CPT

## 2023-08-22 NOTE — PROGRESS NOTES
[facility]  Occupational Therapy Treatment Note    Date: 8/22/2023  Name: Deirdre Gamble Jennifer  Clinic Number: [id number]    Therapy Diagnosis:   Encounter Diagnoses   Name Primary?    Bilateral wrist pain Yes    Difficulty with activities of daily living          Physician: Georges Mosquera, *    Physician Orders: evaluation and treatment  Medical Diagnosis: M25.531,M25.532 (ICD-10-CM) - Pain in both wrists  Evaluation Date: 6/20/2023  Plan of Care Certification Date: 9/15/23  Authorization Period: 6/7/23 - 9/11/2023  Surgery Date and Procedure: N/A  (Conservative Treatment) Date of Injury:   N/A B wrist pain spontaneously, no traumatic event   Date of Return to MD: TBD  FOTO Completion:  46% limitation 6/20/2023    Precautions:  Standard    Time In:  4:15 pm (Pt here on wrong day and 15 min late.)  Time Out: 4:55   Total Billable Time: 40 min treatment ,  SUBJECTIVE     Pt reports that her right thumb is worse since she last attended therapy.  Stated that it no longer hurts at night though. Stated that [facility] offered her an injection yesterday but that she declined.  She was not compliant with home exercise program given last session due to pain. Stated that the r wrist is the one that bothers her the most and that the L is not really bothering her.  Response to previous treatment:increase in pain  Functional change: Nothing    Pain: 3-4/10   Location: bilateral wrist R>L    OBJECTIVE   Objective Measures updated at progress report unless specified.     Range of Motion:      Active/Passive  Shoulder Left  6/20/2023 Right  6/20/2023   Extension       Flexion       Abduction       Internal Rotation       External Rotation             Elbow and Wrist ROM. Within normal limits      Hand ROM. Measured in degrees.    6/21/2023 6/21/2023                   Thumb: MP 50 40                IP 72 60       Rad ADD/ABD 50 50       Pal ADD/ABD 50 40          Opposition To dpc  To dpc           Strength: (DIMITRIS Dynamometer in psi.)        6/20/2023 6/20/2023     Left Right   Rung II nt nt         Pinch Strength (Measured in psi)       6/20/2023 6/20/2023     Left Right   Key Pinch nt  nt    3pt Pinch nt  nt    2pt Pinch nt  nt          Fine Motor Coordination: 9 Hole Peg Test  Left 6/20/2023 Right 6/20/2023               Manual Muscle Test    6/21/2023 6/21/2023           Wrist Extension        Wrist Flexion       Radial Deviation       Ulnar Deviation       Supination       Pronation       Elbow Extension       Elbow Flexion             Special Tests (right hand) 6/20/23:  What test  +  Finklestin  +  Clamp sign -   Thumb CMC grind test  -   ECRB against resistance -  Palpation over the radial styloid +     Treatment       Deirdre received the treatments listed below:     Therapeutic Activities:40 min for the R UE  Clinical reassessment of pain, appearance, and palpation of 1st dorsal compartment  Ultrasound x 8 min to first dorsal compartment 1.0 w/cm2, constant , small 3 mghz head to increase blood flow, decrease pain, and increase soft tissue extensibilty  Soft tissue mobilization 1st dorsal compartment  Application of KT with I strip O to I 1st dorsal compartment and I strip space correction over 1st dorsal compartment      Patient Education and Home Exercises      Education provided:   - HEP and orthosis wear/care/precautions  - Progress towards goals     Written Home Exercises Provided: yes.  Exercises were reviewed and Deirdre was able to demonstrate them prior to the end of the session.  Deirdre demonstrated good  understanding of the HEP provided. See EMR under Patient Instructions for exercises provided during therapy sessions.      ASSESSMENT      Pt reports continued pain that she finds increases with therapy, She reports non-compliance with HEP due to finding her pain increases with this. She was seen by [facility]  yesterday and was offered an injection, however, she declined it. Guarded prognosis  with therapy was discussed with Deirdre.     Deirdre is not progressing well towards her goals and there are no updates to goals at this time. Pt prognosis is guarded at this time.    Pt will continue to benefit from skilled outpatient occupational therapy to address the deficits listed in the problem list on initial evaluation, provide pt/family education and to maximize pt's level of independence in the home and community environment.     Pt's spiritual, cultural and educational needs considered and pt agreeable to plan of care and goals.    Anticipated barriers to occupational therapy: Lack of positive response to therapy thus far.    Goals:    LTG's (12 weeks):  1)   Decrease complaints of pain to  1 out of 10 at worst to increase functional hand use for ADL/work/leisure activities.  2)   Patient to score at 20% or less on Quick DASH to demonstrate improved perception of functional right upper extremity use.  3)   Pt will return to near to prior level of function for ADLs and household management reporting I or Mod I with ADLs         STG's (4 weeks)  1)   Patient to be IND with HEP and modalities for pain/edema managment.  2)   Patient will demonstrate independence with self performing soft tissue mobilization to promote healing   3)   Patient will obtain a right wrist forearm based thumb spica  4)   Pt to be IND with Orthotic use, wear and care precautions. (MET 7/21/2023)   Goal of orthosis: rest the inflamed tendons in the 1st dorsal compartment to decrease inflammation and decrease pain to promote increase in pain-free use of the right hand and and thumb  5)   Decrease complaints of pain to  4 out of 10 at worst to increase functional hand use for ADL/work/leisure activities.    PLAN     Continue skilled occupational therapy with individualized plan of care focusing on decreasing pain and increase in function for @ 2 weeks more and      Updates/Grading for next session: Add US and have pt complete a second  FOTO.    Odilia Mejia, OT

## 2023-08-24 ENCOUNTER — CLINICAL SUPPORT (OUTPATIENT)
Dept: REHABILITATION | Facility: HOSPITAL | Age: 38
End: 2023-08-24
Payer: MEDICAID

## 2023-08-24 DIAGNOSIS — M25.531 BILATERAL WRIST PAIN: Primary | ICD-10-CM

## 2023-08-24 DIAGNOSIS — M25.532 BILATERAL WRIST PAIN: Primary | ICD-10-CM

## 2023-08-24 DIAGNOSIS — Z78.9 DIFFICULTY WITH ACTIVITIES OF DAILY LIVING: ICD-10-CM

## 2023-08-24 PROCEDURE — 97530 THERAPEUTIC ACTIVITIES: CPT

## 2023-08-24 NOTE — PROGRESS NOTES
[facility]  Occupational Therapy Treatment Note    Date: 8/24/2023  Name: Deirdre Gamble Jennifer  Clinic Number: [id number]    Therapy Diagnosis:   Encounter Diagnoses   Name Primary?    Bilateral wrist pain Yes    Difficulty with activities of daily living          Physician: Georges Mosquera, *    Physician Orders: evaluation and treatment  Medical Diagnosis: M25.531,M25.532 (ICD-10-CM) - Pain in both wrists  Evaluation Date: 6/20/2023  Plan of Care Certification Date: 9/15/23  Authorization Period: 6/7/23 - 9/11/2023  Surgery Date and Procedure: N/A  (Conservative Treatment) Date of Injury:   N/A B wrist pain spontaneously, no traumatic event   Date of Return to MD: TBD  FOTO Completion:  46% limitation 6/20/2023    Precautions:  Standard    Time In:  4:15 pm (Pt here on wrong day and 15 min late.)  Time Out: 4:55   Total Billable Time: 40 min treatment ,  SUBJECTIVE     Pt reports that her right thumb is worse since she last attended therapy.  Stated that it no longer hurts at night though. Stated that [facility] offered her an injection yesterday but that she declined.  She was not compliant with home exercise program given last session due to pain. Stated that the r wrist is the one that bothers her the most and that the L is not really bothering her.  Response to previous treatment:increase in pain  Functional change: Nothing    Pain: 3-4/10   Location: bilateral wrist R>L    OBJECTIVE   Objective Measures updated at progress report unless specified.     Range of Motion:      Active/Passive  Shoulder Left  6/20/2023 Right  6/20/2023   Extension       Flexion       Abduction       Internal Rotation       External Rotation             Elbow and Wrist ROM. Within normal limits      Hand ROM. Measured in degrees.    6/21/2023 6/21/2023                   Thumb: MP 50 40                IP 72 60       Rad ADD/ABD 50 50       Pal ADD/ABD 50 40          Opposition To dpc  To dpc           Strength: (DIMITRIS Dynamometer in psi.)        6/20/2023 6/20/2023     Left Right   Rung II nt nt         Pinch Strength (Measured in psi)       6/20/2023 6/20/2023     Left Right   Key Pinch nt  nt    3pt Pinch nt  nt    2pt Pinch nt  nt          Fine Motor Coordination: 9 Hole Peg Test  Left 6/20/2023 Right 6/20/2023               Manual Muscle Test    6/21/2023 6/21/2023           Wrist Extension        Wrist Flexion       Radial Deviation       Ulnar Deviation       Supination       Pronation       Elbow Extension       Elbow Flexion             Special Tests (right hand) 6/20/23:  What test  +  Finklestin  +  Clamp sign -   Thumb CMC grind test  -   ECRB against resistance -  Palpation over the radial styloid +     Treatment       Deirdre received the treatments listed below:     Therapeutic Activities:40 min for the R UE  Paraffin R wrist/hand with moint hot pack x 10 min  Ultrasound x 8 min to first dorsal compartment 1.0 w/cm2, constant , small 3 mghz head to increase blood flow, decrease pain, and increase soft tissue extensibilty  Soft tissue mobilization 1st dorsal compartment using hawk  and gua sha tools  Application of KT with I strip O to I 1st dorsal compartment and I strip space correction over 1st dorsal compartment  Gentle short-arc arom wrist flex/extension, r/ud, pal abd, rad abd, IP flex/extension, circumduction in pain-free range.   Orthotic repair. Pt left orthosis in car and it became deformed and damaged and was causing her pain. It was reheated, remolded, and repaired.     Patient Education and Home Exercises      Education provided:   - HEP and orthosis wear/care/precautions  - Progress towards goals     Written Home Exercises Provided: yes.  Exercises were reviewed and Deirdre was able to demonstrate them prior to the end of the session.  Deirdre demonstrated good  understanding of the HEP provided. See EMR under Patient Instructions for exercises provided during therapy sessions.       ASSESSMENT      Pt reports continued pain 1st dorsal compartment. She stated that she is committed to  attending on a regular basis.     Deirdre is not progressing well towards her goals and there are no updates to goals at this time. Pt prognosis is guarded at this time.    Pt will continue to benefit from skilled outpatient occupational therapy to address the deficits listed in the problem list on initial evaluation, provide pt/family education and to maximize pt's level of independence in the home and community environment.     Pt's spiritual, cultural and educational needs considered and pt agreeable to plan of care and goals.    Anticipated barriers to occupational therapy: Lack of positive response to therapy thus far.    Goals:    LTG's (12 weeks):  1)   Decrease complaints of pain to  1 out of 10 at worst to increase functional hand use for ADL/work/leisure activities.  2)   Patient to score at 20% or less on Quick DASH to demonstrate improved perception of functional right upper extremity use.  3)   Pt will return to near to prior level of function for ADLs and household management reporting I or Mod I with ADLs         STG's (4 weeks)  1)   Patient to be IND with HEP and modalities for pain/edema managment.  2)   Patient will demonstrate independence with self performing soft tissue mobilization to promote healing   3)   Patient will obtain a right wrist forearm based thumb spica  4)   Pt to be IND with Orthotic use, wear and care precautions. (MET 7/21/2023)   Goal of orthosis: rest the inflamed tendons in the 1st dorsal compartment to decrease inflammation and decrease pain to promote increase in pain-free use of the right hand and and thumb  5)   Decrease complaints of pain to  4 out of 10 at worst to increase functional hand use for ADL/work/leisure activities.    PLAN     Continue skilled occupational therapy with individualized plan of care focusing on decreasing pain and increase in function for  @ 2 weeks more and  see if a positive repsonse can be achieved.     Updates/Grading for next session: continue with same    Odilia Mejia OT

## 2023-08-30 ENCOUNTER — CLINICAL SUPPORT (OUTPATIENT)
Dept: REHABILITATION | Facility: HOSPITAL | Age: 38
End: 2023-08-30
Payer: MEDICAID

## 2023-08-30 DIAGNOSIS — M25.532 BILATERAL WRIST PAIN: Primary | ICD-10-CM

## 2023-08-30 DIAGNOSIS — Z78.9 DIFFICULTY WITH ACTIVITIES OF DAILY LIVING: ICD-10-CM

## 2023-08-30 DIAGNOSIS — M25.531 BILATERAL WRIST PAIN: Primary | ICD-10-CM

## 2023-08-30 PROCEDURE — 97530 THERAPEUTIC ACTIVITIES: CPT | Mod: CO

## 2023-08-30 NOTE — PROGRESS NOTES
[facility]  Occupational Therapy Treatment Note    Date: 8/30/2023  Name: Deirdre Rodriguez  Clinic Number: [id number]    Therapy Diagnosis:   Encounter Diagnoses   Name Primary?    Bilateral wrist pain Yes    Difficulty with activities of daily living            Physician: Georges Mosquera, *    Physician Orders: evaluation and treatment  Medical Diagnosis: M25.531,M25.532 (ICD-10-CM) - Pain in both wrists  Evaluation Date: 6/20/2023  Plan of Care Certification Date: 9/15/23  Authorization Period: 6/7/23 - 9/11/2023  Surgery Date and Procedure: N/A  (Conservative Treatment) Date of Injury:   N/A B wrist pain spontaneously, no traumatic event   Date of Return to MD: TBD  FOTO Completion:  46% limitation 6/20/2023    Precautions:  Standard    Time In:  5 pm   Time Out: 5:45   Total Billable Time: 45 min treatment ,  SUBJECTIVE     Pt reports ability to move thumb more with less pain.   She was not compliant with home exercise program given last session due to pain. Stated that the r wrist is the one that bothers her the most and that the L is not really bothering her.  Response to previous treatment:increase in pain  Functional change: Nothing    Pain: 3-4/10   Location: bilateral wrist R>L    OBJECTIVE   Objective Measures updated at progress report unless specified.     Range of Motion:      Active/Passive  Shoulder Left  6/20/2023 Right  6/20/2023   Extension       Flexion       Abduction       Internal Rotation       External Rotation             Elbow and Wrist ROM. Within normal limits      Hand ROM. Measured in degrees.    6/21/2023 6/21/2023                   Thumb: MP 50 40                IP 72 60       Rad ADD/ABD 50 50       Pal ADD/ABD 50 40          Opposition To dpc  To dpc          Strength: (DIMITRIS Dynamometer in psi.)        6/20/2023 6/20/2023     Left Right   Rung II nt nt         Pinch Strength (Measured in psi)       6/20/2023 6/20/2023     Left Right   Key  Pinch nt  nt    3pt Pinch nt  nt    2pt Pinch nt  nt          Fine Motor Coordination: 9 Hole Peg Test  Left 6/20/2023 Right 6/20/2023               Manual Muscle Test    6/21/2023 6/21/2023           Wrist Extension        Wrist Flexion       Radial Deviation       Ulnar Deviation       Supination       Pronation       Elbow Extension       Elbow Flexion             Special Tests (right hand) 6/20/23:  What test  +  Finklestin  +  Clamp sign -   Thumb CMC grind test  -   ECRB against resistance -  Palpation over the radial styloid +     Treatment       Deirdre received the treatments listed below:     Therapeutic Activities: 45 min for the R UE  Paraffin R wrist/hand with moint hot pack x 10 min  Ultrasound x 8 min to first dorsal compartment 1.0 w/cm2, constant , small 3 mghz head to increase blood flow, decrease pain, and increase soft tissue extensibilty  Soft tissue mobilization 1st dorsal compartment using hawk  and gua sha tools  Application of KT with I strip O to I 1st dorsal compartment and I strip space correction over 1st dorsal compartment  Gentle short-arc arom wrist flex/extension, r/ud, pal abd, rad abd, IP flex/extension, circumduction, opposition with pinky slide in pain-free range.       Patient Education and Home Exercises      Education provided:   - HEP and orthosis wear/care/precautions  - Progress towards goals     Written Home Exercises Provided: yes.  Exercises were reviewed and Deirdre was able to demonstrate them prior to the end of the session.  Deirdre demonstrated good  understanding of the HEP provided. See EMR under Patient Instructions for exercises provided during therapy sessions.      ASSESSMENT      Pt reports continued pain 1st dorsal compartment. However, she was able to tolerate more ROM with her wrist and thumb today with less pain as compared to prior visits.     Deirdre is not progressing well towards her goals and there are no updates to goals at this time. Pt prognosis  is guarded at this time.    Pt will continue to benefit from skilled outpatient occupational therapy to address the deficits listed in the problem list on initial evaluation, provide pt/family education and to maximize pt's level of independence in the home and community environment.     Pt's spiritual, cultural and educational needs considered and pt agreeable to plan of care and goals.    Anticipated barriers to occupational therapy: Lack of positive response to therapy thus far.    Goals:    LTG's (12 weeks):  1)   Decrease complaints of pain to  1 out of 10 at worst to increase functional hand use for ADL/work/leisure activities.  2)   Patient to score at 20% or less on Quick DASH to demonstrate improved perception of functional right upper extremity use.  3)   Pt will return to near to prior level of function for ADLs and household management reporting I or Mod I with ADLs         STG's (4 weeks)  1)   Patient to be IND with HEP and modalities for pain/edema managment.  2)   Patient will demonstrate independence with self performing soft tissue mobilization to promote healing   3)   Patient will obtain a right wrist forearm based thumb spica  4)   Pt to be IND with Orthotic use, wear and care precautions. (MET 7/21/2023)   Goal of orthosis: rest the inflamed tendons in the 1st dorsal compartment to decrease inflammation and decrease pain to promote increase in pain-free use of the right hand and and thumb  5)   Decrease complaints of pain to  4 out of 10 at worst to increase functional hand use for ADL/work/leisure activities.    PLAN     Continue skilled occupational therapy with individualized plan of care focusing on decreasing pain and increase in function for @ 2 weeks more and  see if a positive repsonse can be achieved.     Updates/Grading for next session: continue with same    SPRING Rivera           Client Care conference completed with evaluating therapist in regards to this patients POC as  evidenced by co signature of supervising therapist.

## 2023-08-31 ENCOUNTER — CLINICAL SUPPORT (OUTPATIENT)
Dept: REHABILITATION | Facility: HOSPITAL | Age: 38
End: 2023-08-31
Payer: MEDICAID

## 2023-08-31 DIAGNOSIS — Z78.9 DIFFICULTY WITH ACTIVITIES OF DAILY LIVING: ICD-10-CM

## 2023-08-31 DIAGNOSIS — M25.532 BILATERAL WRIST PAIN: Primary | ICD-10-CM

## 2023-08-31 DIAGNOSIS — M25.531 BILATERAL WRIST PAIN: Primary | ICD-10-CM

## 2023-08-31 PROCEDURE — 97530 THERAPEUTIC ACTIVITIES: CPT

## 2023-08-31 NOTE — PROGRESS NOTES
[facility]  Occupational Therapy Treatment Note    Date: 8/31/2023  Name: Deirdre Rodriguez  Clinic Number: [id number]    Therapy Diagnosis:   Encounter Diagnoses   Name Primary?    Bilateral wrist pain Yes    Difficulty with activities of daily living        Physician: Georges Mosquera, *    Physician Orders: evaluation and treatment  Medical Diagnosis: M25.531,M25.532 (ICD-10-CM) - Pain in both wrists  Evaluation Date: 6/20/2023  Plan of Care Certification Date: 9/15/23  Authorization Period: 6/7/23 - 9/11/2023  Surgery Date and Procedure: N/A  (Conservative Treatment) Date of Injury:   N/A B wrist pain spontaneously, no traumatic event   Date of Return to MD: TBD  FOTO Completion:  46% limitation 6/20/2023    Precautions:  Standard    Time In:  4:27 pm - patient arriving 27 min late  Time Out: 4:57 pm   Total Billable Time: 30 min  SUBJECTIVE     Pt reports ability to move thumb more with less pain.   She was not compliant with home exercise program given last session due to pain. Stated that the r wrist is the one that bothers her the most and that the L is not really bothering her.  Response to previous treatment:increase in pain  Functional change: Nothing    Pain: 3-4/10   Location: bilateral wrist R>L    OBJECTIVE   Objective Measures updated at progress report unless specified.     Range of Motion:      Active/Passive  Shoulder Left  6/20/2023 Right  6/20/2023   Extension       Flexion       Abduction       Internal Rotation       External Rotation             Elbow and Wrist ROM. Within normal limits      Hand ROM. Measured in degrees.    6/21/2023 6/21/2023                   Thumb: MP 50 40                IP 72 60       Rad ADD/ABD 50 50       Pal ADD/ABD 50 40          Opposition To dpc  To dpc          Strength: (DIMITRIS Dynamometer in psi.)        6/20/2023 6/20/2023     Left Right   Rung II nt nt         Pinch Strength (Measured in psi)       6/20/2023 6/20/2023      Left Right   Key Pinch nt  nt    3pt Pinch nt  nt    2pt Pinch nt  nt          Fine Motor Coordination: 9 Hole Peg Test  Left 6/20/2023 Right 6/20/2023               Manual Muscle Test    6/21/2023 6/21/2023           Wrist Extension        Wrist Flexion       Radial Deviation       Ulnar Deviation       Supination       Pronation       Elbow Extension       Elbow Flexion             Special Tests (right hand) 6/20/23:  What test  +  Finklestin  +  Clamp sign -   Thumb CMC grind test  -   ECRB against resistance -  Palpation over the radial styloid +     Treatment       Deirdre received the treatments listed below:     Therapeutic Activities: 30 min for the R UE  Paraffin R wrist/hand with moint hot pack x 10 min NT due to patient arriving late  Ultrasound x 8 min to first dorsal compartment 1.0 w/cm2, constant , small 3 mghz head to increase blood flow, decrease pain, and increase soft tissue extensibilty  Soft tissue mobilization 1st dorsal compartment using hawk  and gua sha tools  PROM composite thumb flexion  Application of KT with I strip O to I 1st dorsal compartment and I strip space correction over 1st dorsal compartment  Isospheres (2 min)  Gentle short-arc arom wrist flex/extension, r/ud, pal abd, rad abd, IP flex/extension, circumduction, opposition with pinky slide in pain-free range.       Patient Education and Home Exercises      Education provided:   - HEP and orthosis wear/care/precautions  - Progress towards goals     Written Home Exercises Provided: yes.  Exercises were reviewed and Deirdre was able to demonstrate them prior to the end of the session.  Deirdre demonstrated good  understanding of the HEP provided. See EMR under Patient Instructions for exercises provided during therapy sessions.      ASSESSMENT     Pt reports continued pain 1st dorsal compartment. However, she was able to tolerate more ROM with her wrist and thumb today with less pain as compared to prior visits. She notes  relief with KT application and tolerated gentle thumb PROM.    Deirdre is not progressing well towards her goals and there are no updates to goals at this time. Pt prognosis is guarded at this time.    Pt will continue to benefit from skilled outpatient occupational therapy to address the deficits listed in the problem list on initial evaluation, provide pt/family education and to maximize pt's level of independence in the home and community environment.     Pt's spiritual, cultural and educational needs considered and pt agreeable to plan of care and goals.    Anticipated barriers to occupational therapy: Lack of positive response to therapy thus far.    Goals:    LTG's (12 weeks):  1)   Decrease complaints of pain to  1 out of 10 at worst to increase functional hand use for ADL/work/leisure activities.  2)   Patient to score at 20% or less on Quick DASH to demonstrate improved perception of functional right upper extremity use.  3)   Pt will return to near to prior level of function for ADLs and household management reporting I or Mod I with ADLs         STG's (4 weeks)  1)   Patient to be IND with HEP and modalities for pain/edema managment.  2)   Patient will demonstrate independence with self performing soft tissue mobilization to promote healing   3)   Patient will obtain a right wrist forearm based thumb spica  4)   Pt to be IND with Orthotic use, wear and care precautions. (MET 7/21/2023)   Goal of orthosis: rest the inflamed tendons in the 1st dorsal compartment to decrease inflammation and decrease pain to promote increase in pain-free use of the right hand and and thumb  5)   Decrease complaints of pain to  4 out of 10 at worst to increase functional hand use for ADL/work/leisure activities.    PLAN     Continue skilled occupational therapy with individualized plan of care focusing on decreasing pain and increase in function for @ 2 weeks more and  see if a positive repsonse can be achieved.      Updates/Grading for next session: continue with same    Elif Narvaez OT           Client Care conference completed with evaluating therapist in regards to this patients POC as evidenced by co signature of supervising therapist.

## 2023-09-07 ENCOUNTER — CLINICAL SUPPORT (OUTPATIENT)
Dept: REHABILITATION | Facility: HOSPITAL | Age: 38
End: 2023-09-07
Payer: MEDICAID

## 2023-09-07 DIAGNOSIS — M25.532 BILATERAL WRIST PAIN: Primary | ICD-10-CM

## 2023-09-07 DIAGNOSIS — Z78.9 DIFFICULTY WITH ACTIVITIES OF DAILY LIVING: ICD-10-CM

## 2023-09-07 DIAGNOSIS — M25.531 BILATERAL WRIST PAIN: Primary | ICD-10-CM

## 2023-09-07 NOTE — PROGRESS NOTES
[facility]  Occupational Therapy Treatment Note    Date: 9/7/2023  Name: Deirdre Gamble Jennifer  Clinic Number: [id number]    Therapy Diagnosis:   Encounter Diagnoses   Name Primary?    Bilateral wrist pain Yes    Difficulty with activities of daily living        Physician: Georges Mosquera, *    Physician Orders: evaluation and treatment  Medical Diagnosis: M25.531,M25.532 (ICD-10-CM) - Pain in both wrists  Evaluation Date: 6/20/2023  Plan of Care Certification Date: 9/15/23  Authorization Period: 6/7/23 - 9/11/2023  Surgery Date and Procedure: N/A  (Conservative Treatment) Date of Injury:   N/A B wrist pain spontaneously, no traumatic event   Date of Return to MD: TBD  FOTO Completion:  46% limitation 6/20/2023    Precautions:  Standard    Time In:  4:10 pm - patient arriving 10 min late  Time Out: 4:50 pm   Total Billable Time: 30 min  SUBJECTIVE     Pt reports that since the beginning of therapy, her pain Reports continued limited function of the R hand. Reports that the pain is sudden at times and that the pain is spreading to dorsum of her hand. Reports pain with activities that requires wrist flexion  She was not compliant with home exercise program given last session due to pain. Stated that the r wrist is the one that bothers her the most and that the L is not really bothering her.  Response to previous treatment:increase in pain  Functional change: Nothing    Pain: 5/10 at its worst,  2,/10 with use  Location: bilateral wrist R>L    OBJECTIVE   Objective Measures updated at progress report unless specified.     Range of Motion:         Elbow and Wrist ROM. Within normal limits      Hand ROM. Measured in degrees.    6/21/2023 6/21/2023 9/7/2023     left  right  left            Thumb: MP 50 40 53                IP 72 60 82       Rad ADD/ABD 50 50 40        Pal ADD/ABD 50 40 35          Opposition To dpc  To dpc  Pain To dpc           Strength: (DIMITRIS Dynamometer in psi.)         6/20/2023 6/20/2023     Left Right   Rung II nt nt         Pinch Strength (Measured in psi)       6/20/2023 6/20/2023     Left Right   Key Pinch nt  nt    3pt Pinch nt  nt    2pt Pinch nt  nt          Fine Motor Coordination: 9 Hole Peg Test  Left 6/20/2023 Right 6/20/2023               Manual Muscle Test    6/21/2023 6/21/2023           Wrist Extension        Wrist Flexion       Radial Deviation       Ulnar Deviation       Supination       Pronation       Elbow Extension       Elbow Flexion             Special Tests (right hand) 6/20/23:  What test  +  Finklestin  mildly +  Clamp sign -   Thumb CMC grind test  -   ECRB against resistance -  Palpation over the radial styloid -  Resistance to radial thumb abd +  Treatment       Deirdre received the treatments listed below:     Therapeutic Activities: 30 min for the R UE  Paraffin R wrist/hand with moint hot pack x 10 min NT due to patient arriving late  Ultrasound x 8 min to first dorsal compartment 1.0 w/cm2, constant , small 3 mghz head to increase blood flow, decrease pain, and increase soft tissue extensibilty  Soft tissue mobilization 1st dorsal compartment using hawk  and gua sha tools  PROM composite thumb flexion  Application of KT with I strip O to I 1st dorsal compartment and I strip space correction over 1st dorsal compartment  Isospheres (2 min)  Gentle short-arc arom wrist flex/extension, r/ud, pal abd, rad abd, IP flex/extension, circumduction, opposition with pinky slide in pain-free range.       Patient Education and Home Exercises      Education provided:   - HEP and orthosis wear/care/precautions  - Progress towards goals     Written Home Exercises Provided: yes.  Exercises were reviewed and Deirdre was able to demonstrate them prior to the end of the session.  Deirdre demonstrated good  understanding of the HEP provided. See EMR under Patient Instructions for exercises provided during therapy sessions.      ASSESSMENT     Pt reports  continued pain 1st dorsal compartment. However, she was able to tolerate more ROM with her wrist and thumb today with less pain as compared to prior visits. She notes relief with KT application and tolerated gentle thumb PROM.    Deirdre is not progressing well towards her goals and there are no updates to goals at this time. Pt prognosis is guarded at this time.    Pt will continue to benefit from skilled outpatient occupational therapy to address the deficits listed in the problem list on initial evaluation, provide pt/family education and to maximize pt's level of independence in the home and community environment.     Pt's spiritual, cultural and educational needs considered and pt agreeable to plan of care and goals.    Anticipated barriers to occupational therapy: Lack of positive response to therapy thus far.    Goals:    LTG's (12 weeks):  1)   Decrease complaints of pain to  1 out of 10 at worst to increase functional hand use for ADL/work/leisure activities.  2)   Patient to score at 20% or less on Quick DASH to demonstrate improved perception of functional right upper extremity use.  3)   Pt will return to near to prior level of function for ADLs and household management reporting I or Mod I with ADLs         STG's (4 weeks)  1)   Patient to be IND with HEP and modalities for pain/edema managment.  2)   Patient will demonstrate independence with self performing soft tissue mobilization to promote healing   3)   Patient will obtain a right wrist forearm based thumb spica  4)   Pt to be IND with Orthotic use, wear and care precautions. (MET 7/21/2023)   Goal of orthosis: rest the inflamed tendons in the 1st dorsal compartment to decrease inflammation and decrease pain to promote increase in pain-free use of the right hand and and thumb  5)   Decrease complaints of pain to  4 out of 10 at worst to increase functional hand use for ADL/work/leisure activities.    PLAN     Continue skilled occupational therapy  with individualized plan of care focusing on decreasing pain and increase in function for @ 2 weeks more and  see if a positive repsonse can be achieved.     Updates/Grading for next session: continue with same    Odilia Mejia OT           Client Care conference completed with evaluating therapist in regards to this patients POC as evidenced by co signature of supervising therapist.

## 2024-01-17 ENCOUNTER — OFFICE VISIT (OUTPATIENT)
Dept: URGENT CARE | Facility: CLINIC | Age: 39
End: 2024-01-17
Payer: COMMERCIAL

## 2024-01-17 VITALS
BODY MASS INDEX: 33.38 KG/M2 | HEIGHT: 60 IN | OXYGEN SATURATION: 98 % | RESPIRATION RATE: 18 BRPM | WEIGHT: 170 LBS | SYSTOLIC BLOOD PRESSURE: 132 MMHG | DIASTOLIC BLOOD PRESSURE: 92 MMHG | HEART RATE: 93 BPM | TEMPERATURE: 98 F
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
## 2024-01-17 DIAGNOSIS — S80.02XA CONTUSION OF LEFT KNEE, INITIAL ENCOUNTER: ICD-10-CM

## 2024-01-17 DIAGNOSIS — S01.81XA FACIAL LACERATION, INITIAL ENCOUNTER: Primary | ICD-10-CM

## 2024-01-17 DIAGNOSIS — S01.80XA AVULSION OF SKIN OF FACE, INITIAL ENCOUNTER: ICD-10-CM

## 2024-01-17 DIAGNOSIS — S80.01XA CONTUSION OF RIGHT KNEE, INITIAL ENCOUNTER: ICD-10-CM

## 2024-01-17 DIAGNOSIS — Z02.6 ENCOUNTER RELATED TO WORKER'S COMPENSATION CLAIM: ICD-10-CM

## 2024-01-17 PROCEDURE — 90714 TD VACC NO PRESV 7 YRS+ IM: CPT | Mod: S$GLB,,, | Performed by: SURGERY

## 2024-01-17 PROCEDURE — 90471 IMMUNIZATION ADMIN: CPT | Mod: S$GLB,,, | Performed by: SURGERY

## 2024-01-17 PROCEDURE — 99203 OFFICE O/P NEW LOW 30 MIN: CPT | Mod: 25,S$GLB,, | Performed by: SURGERY

## 2024-01-17 NOTE — PROCEDURES
Laceration Repair    Date/Time: 1/17/2024 9:30 AM    Performed by: Lori Miller MD  Authorized by: Lori Miller MD  Consent Done: Not Needed  Body area: head/neck  Location details: right cheek  Laceration length: 2 cm  Foreign bodies: no foreign bodies  Tendon involvement: none  Nerve involvement: none  Vascular damage: no    Patient sedated: no  Irrigation solution: saline  Irrigation method: syringe  Amount of cleaning: extensive  Debridement: none  Degree of undermining: minimal  Skin closure: Steri-Strips  Dressing: adhesive bandage and Steri-Strips  Patient tolerance: Patient tolerated the procedure well with no immediate complications

## 2024-01-17 NOTE — LETTER
[facility] - Occupational Health  [address] [address]  Phone: [phone]  Fax: [phone]  [facility] [employer]: [email or web address]    Pt Name: Deirdre Rodriguez  Injury Date: 01/17/2024   Employee ID: [id number] Date of First Treatment: 01/17/2024   Company: [employer]      Appointment Time:  Arrived: 9:30 AM    Provider: Lori Miller MD Time Out:10:58 AM      Office Treatment:   1. Facial laceration, initial encounter    2. Encounter related to worker's compensation claim    3. Fall, initial encounter    4. Contusion of left knee, initial encounter    5. Contusion of right knee, initial encounter          Patient Instructions: Keep dressing clean/dry/covered, Attention not to aggravate affected area, Apply ice 24-48 hours then apply heat/warm soaks, Do not apply ointments or creams unless directed      Restrictions: Home today, Regular Duty (Keep dressing in place while outside/working)     Return Appointment: 1/19/2024 at 8:45 AM [facility]

## 2024-01-17 NOTE — PROGRESS NOTES
Subjective:      Patient ID: Deirdre Rodriguez is a 38 y.o. female.    Chief Complaint: Eye Injury (Right )    Patient's place of employment - [employer]   Patient's job title -    Date of injury - 1/17/23  Body part injured including left or right - Right Eye  Injury Mechanism - Fall  What they were doing when they got hurt - She was walking when she tripped and fell hitting her right eye  What they did immediately after - Reported it and came to [facility] health   Pain scale right now - 8/10       Eye Exam:    Far: Both:20/20  R:20/20  Left:20/20    Near: Both:20/20  Right 20/30  Left 20/30    EC    Eye Injury   Associated symptoms include eye redness. Pertinent negatives include no blurred vision, eye discharge or double vision.       Constitution: Positive for activity change. Negative for generalized weakness.   Eyes:  Positive for eye trauma, eye pain, eye redness and eyelid swelling. Negative for foreign body in eye, eye discharge, vision loss, double vision and blurred vision.   Skin:  Positive for color change, laceration and bruising.   Neurological:  Positive for headaches. Negative for dizziness, history of vertigo, light-headedness and passing out.     See MA note above. Begin MD note:    Deirdre Rodriguez is a 38 y.o. presenting for evaluation of right facial laceration after fall. She was walking and tripped forward hitting the right side of her face against the water cooler and landing on both knees. She was wearing sunglasses, they did not break. She was wearing two layers of pants and denies any pain to bilateral knees. Last tetanus is unknown. She is not on blood thinners. She denies any vision changes.     Objective:     Physical Exam  Vitals and nursing note reviewed.   HENT:      Head: Normocephalic. Laceration present.        Comments: Skin avulsion/laceration beneath right eye, no ocular involvement (see media). Minimal undermining, no tendon involvement  appreciated. Hemostatic. Laceration repaired with steri-strips (see procedure note).  Eyes:      General: Vision grossly intact. Gaze aligned appropriately.      Extraocular Movements: Extraocular movements intact.      Conjunctiva/sclera: Conjunctivae normal.   Pulmonary:      Effort: Pulmonary effort is normal.   Neurological:      General: No focal deficit present.      Mental Status: She is alert and oriented to person, place, and time.      Motor: Motor function is intact.      Coordination: Coordination is intact.   Psychiatric:         Attention and Perception: Attention normal.         Mood and Affect: Mood normal.         Speech: Speech normal.         Behavior: Behavior normal. Behavior is cooperative.         Thought Content: Thought content normal.          Assessment:      1. Facial laceration, initial encounter    2. Encounter related to worker's compensation claim    3. Fall, initial encounter    4. Contusion of left knee, initial encounter    5. Contusion of right knee, initial encounter    6. Avulsion of skin of face, initial encounter      Plan:     Tetanus updated. Wound care instructions provided. Regular duty. Follow-up in 2 days for wound check.    Diagnoses and plan discussed with the patient, as well as the expected course and duration of symptoms. Risks and benefits of any medication prescribed during this visit was explained, verbal instructions on use given. Clinic/Emergency department return precautions were given, can return to [facility] before scheduled follow-up appointment if notes worsening/aggravation of symptoms. All questions and concerns were addressed prior to discharge. Plan was developed with active input from the patient and they verbalized understanding of and agreement with the POC.   Note was dictated with voice recognition software, please excuse any grammatical errors.    I spent a total of 30 minutes on the day of the visit.  This includes face to face time and  non-face to face time preparing to see the patient (eg, review of tests), obtaining and/or reviewing separately obtained history, documenting clinical information in the electronic or other health record, independently interpreting results and communicating results to the patient/family/caregiver, or care coordinator.       Patient Instructions: Keep dressing clean/dry/covered, Attention not to aggravate affected area, Apply ice 24-48 hours then apply heat/warm soaks, Do not apply ointments or creams unless directed   Restrictions: Home today, Regular Duty (Keep dressing in place while outside/working)  Follow up in about 2 days (around 1/19/2024).

## 2024-01-19 ENCOUNTER — OFFICE VISIT (OUTPATIENT)
Dept: URGENT CARE | Facility: CLINIC | Age: 39
End: 2024-01-19
Payer: COMMERCIAL

## 2024-01-19 VITALS
BODY MASS INDEX: 33.38 KG/M2 | HEIGHT: 60 IN | RESPIRATION RATE: 18 BRPM | HEART RATE: 72 BPM | WEIGHT: 170 LBS | SYSTOLIC BLOOD PRESSURE: 120 MMHG | OXYGEN SATURATION: 98 % | DIASTOLIC BLOOD PRESSURE: 80 MMHG | TEMPERATURE: 98 F

## 2024-01-19 DIAGNOSIS — S80.02XD CONTUSION OF LEFT KNEE, SUBSEQUENT ENCOUNTER: ICD-10-CM

## 2024-01-19 DIAGNOSIS — Z02.6 ENCOUNTER RELATED TO WORKER'S COMPENSATION CLAIM: ICD-10-CM

## 2024-01-19 DIAGNOSIS — Z48.00 DRESSING CHANGE OR REMOVAL, NONSURGICAL WOUND: ICD-10-CM
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
## 2024-01-19 DIAGNOSIS — S01.80XD: ICD-10-CM

## 2024-01-19 DIAGNOSIS — S13.4XXD WHIPLASH INJURY TO NECK, SUBSEQUENT ENCOUNTER: ICD-10-CM

## 2024-01-19 DIAGNOSIS — S16.1XXA ACUTE CERVICAL MYOFASCIAL STRAIN, INITIAL ENCOUNTER: ICD-10-CM

## 2024-01-19 DIAGNOSIS — S80.01XD CONTUSION OF RIGHT KNEE, SUBSEQUENT ENCOUNTER: ICD-10-CM

## 2024-01-19 DIAGNOSIS — S01.81XD FACIAL LACERATION, SUBSEQUENT ENCOUNTER: Primary | ICD-10-CM

## 2024-01-19 DIAGNOSIS — T14.8XXD ENCOUNTER FOR RE-CHECK OF LACERATION WOUND: ICD-10-CM

## 2024-01-19 PROCEDURE — 99213 OFFICE O/P EST LOW 20 MIN: CPT | Mod: S$GLB,,, | Performed by: SURGERY

## 2024-01-19 RX ORDER — CYCLOBENZAPRINE HCL 5 MG
5 TABLET ORAL 3 TIMES DAILY
Qty: 21 TABLET | Refills: 0 | Status: SHIPPED | OUTPATIENT
Start: 2024-01-19 | End: 2024-01-26

## 2024-01-19 NOTE — LETTER
[facility] - Occupational Health  [address] [address]  Phone: [phone]  Fax: [phone]  [facility]: [id number]    Pt Name: Deirdre Rodriguez  Injury Date: 01/17/2024   Employee ID: [id number] Date of Treatment: 01/19/2024   Company: [employer]      Appointment Time: 08:45 AM Arrived: 8:38 AM    Provider: Lori Miller MD Time Out:9:43 AM      Office Treatment:   1. Facial laceration, subsequent encounter    2. Encounter related to worker's compensation claim    3. Fall, subsequent encounter    4. Contusion of left knee, subsequent encounter    5. Contusion of right knee, subsequent encounter    6. Avulsion of skin of face, subsequent encounter    7. Dressing change or removal, nonsurgical wound    8. Encounter for re-check of laceration wound    9. Acute cervical myofascial strain, initial encounter      Medications Ordered This Encounter   Medications    cyclobenzaprine (FLEXERIL) 5 MG tablet        Patient Instructions:  (Try advil dual action)      Restrictions: Home today, Sit or stand as needed, Sedentary work only, No driving company vehicles (RTW 1/22/24; Must avoid driving while using muscle relaxer)     Return Appointment: 1/26/2024 at 9:15 AM STONEY

## 2024-01-19 NOTE — PROGRESS NOTES
Subjective:      Patient ID: Deirdre Rodriguez is a 38 y.o. female.    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       Constitution: Negative for activity change and generalized weakness.   Neck: Positive for neck pain and neck stiffness.   Eyes:  Negative for eye discharge, eye pain, eye redness, vision loss, double vision and blurred vision.   Skin:  Positive for color change, laceration and bruising.   Neurological:  Negative for numbness and tingling.   Psychiatric/Behavioral:  Negative for sleep disturbance.        See MA note above. Begin MD note:    Deirdre Rodriguez is a 38 y.o. presenting for follow-up of right face laceration after fall. She reports a feeling of malaise and significant fatigue. She returned to work yesterday, but was unable to drive. She notes pain to the posterior neck. Has been using tylenol. She is avoiding washing the right side of her face.     Objective:     Physical Exam  Vitals and nursing note reviewed.   HENT:      Head: Normocephalic. Right periorbital erythema (ecchymosis) and laceration present.      Comments: Some swelling and bruising is appreciated near the lateral aspect of right lower lid. No drainage or discharge from laceration. (See Media)  Eyes:      Conjunctiva/sclera: Conjunctivae normal.   Neck:        Comments: FAROM of the neck with report of pain on rotation and lateral flexion to right and forward  flexion  Pulmonary:      Effort: Pulmonary effort is normal.   Musculoskeletal:      Cervical back: Normal range of motion. Pain with movement and muscular tenderness present.   Neurological:      General: No focal deficit present.      Mental Status: She is alert and oriented to person, place, and time.      Motor: Motor function is intact.      Coordination: Coordination is intact.   Psychiatric:         Attention and Perception: Attention normal.         Mood and Affect: Mood normal.         Speech: Speech normal.         Behavior: Behavior normal. Behavior is cooperative.         Thought Content: Thought content normal.              Assessment:      1. Facial laceration, subsequent encounter    2. Encounter related to worker's compensation claim    3. Fall, subsequent encounter    4. Contusion of left knee, subsequent encounter    5. Contusion of right knee, subsequent encounter    6. Avulsion of skin of face, subsequent encounter    7. Dressing change or removal, nonsurgical wound    8. Encounter for re-check of laceration wound    9. Acute cervical myofascial strain, initial encounter    10. Whiplash injury to neck, subsequent encounter      Plan:     No signs of infection. She has some pain with neck movement which is not unexpected given the mechanism of injury. I provided muscle relaxer script to address muscle tension and advised use of OTC tylenol/ibuprofen. Muscle relaxer may cause drowsiness, I discussed precautions of no driving while taking this medication. Work restrictions are placed. Follow-up in 1 week.     Diagnoses and plan discussed with the patient, as well as the expected course and duration of symptoms. Risks and benefits of any medication prescribed during this visit was explained, verbal instructions on use given. Clinic/Emergency department return precautions were given, can return to [facility] before scheduled follow-up appointment if notes worsening/aggravation of symptoms. All  questions and concerns were addressed prior to discharge. Plan was developed with active input from the patient and they verbalized understanding of and agreement with the POC.   Note was dictated with voice recognition software, please excuse any grammatical errors.    I spent a total of 20 minutes on the day of the visit.  This includes face to face time and non-face to face time preparing to see the patient (eg, review of tests), obtaining and/or reviewing separately obtained history, documenting clinical information in the electronic or other health record, independently interpreting results and communicating results to the patient/family/caregiver, or care coordinator.    Medications Ordered This Encounter   Medications    cyclobenzaprine (FLEXERIL) 5 MG tablet     Sig: Take 1 tablet (5 mg total) by mouth 3 (three) times daily. for 7 days     Dispense:  21 tablet     Refill:  0     Patient Instructions:  (Try advil dual action)   Restrictions: Home today, Sit or stand as needed, Sedentary work only, No driving company vehicles (RTW 1/22/24; Must avoid driving while using muscle relaxer)  Follow up in about 1 week (around 1/26/2024) for Wound recheck.

## 2024-01-22 ENCOUNTER — TELEPHONE (OUTPATIENT)
Dept: OBSTETRICS AND GYNECOLOGY | Facility: CLINIC | Age: 39
End: 2024-01-22
Payer: MEDICAID

## 2024-01-22 ENCOUNTER — OFFICE VISIT (OUTPATIENT)
Dept: OBSTETRICS AND GYNECOLOGY | Facility: CLINIC | Age: 39
End: 2024-01-22
Payer: MEDICAID

## 2024-01-22 ENCOUNTER — OFFICE VISIT (OUTPATIENT)
Dept: URGENT CARE | Facility: CLINIC | Age: 39
End: 2024-01-22
Payer: COMMERCIAL

## 2024-01-22 VITALS
HEIGHT: 60 IN | WEIGHT: 172.19 LBS | SYSTOLIC BLOOD PRESSURE: 126 MMHG | DIASTOLIC BLOOD PRESSURE: 90 MMHG | BODY MASS INDEX: 33.8 KG/M2

## 2024-01-22 VITALS
WEIGHT: 170 LBS | BODY MASS INDEX: 33.38 KG/M2 | HEART RATE: 81 BPM | SYSTOLIC BLOOD PRESSURE: 120 MMHG | OXYGEN SATURATION: 98 % | DIASTOLIC BLOOD PRESSURE: 77 MMHG | HEIGHT: 60 IN | TEMPERATURE: 98 F | RESPIRATION RATE: 18 BRPM

## 2024-01-22 DIAGNOSIS — Z86.19 HISTORY OF SEXUALLY TRANSMITTED DISEASE: ICD-10-CM

## 2024-01-22 DIAGNOSIS — S80.01XD CONTUSION OF RIGHT KNEE, SUBSEQUENT ENCOUNTER: ICD-10-CM

## 2024-01-22 DIAGNOSIS — Z02.6 ENCOUNTER RELATED TO WORKER'S COMPENSATION CLAIM: ICD-10-CM
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
## 2024-01-22 DIAGNOSIS — S80.02XD CONTUSION OF LEFT KNEE, SUBSEQUENT ENCOUNTER: ICD-10-CM

## 2024-01-22 DIAGNOSIS — S01.80XD: ICD-10-CM

## 2024-01-22 DIAGNOSIS — S01.81XD FACIAL LACERATION, SUBSEQUENT ENCOUNTER: Primary | ICD-10-CM

## 2024-01-22 DIAGNOSIS — N76.0 VAGINOSIS: Primary | ICD-10-CM

## 2024-01-22 PROCEDURE — 3074F SYST BP LT 130 MM HG: CPT | Mod: CPTII,,, | Performed by: OBSTETRICS & GYNECOLOGY

## 2024-01-22 PROCEDURE — 99213 OFFICE O/P EST LOW 20 MIN: CPT | Mod: S$GLB,,, | Performed by: SURGERY

## 2024-01-22 PROCEDURE — 81514 NFCT DS BV&VAGINITIS DNA ALG: CPT | Performed by: OBSTETRICS & GYNECOLOGY

## 2024-01-22 PROCEDURE — 99213 OFFICE O/P EST LOW 20 MIN: CPT | Mod: PBBFAC,PO | Performed by: OBSTETRICS & GYNECOLOGY

## 2024-01-22 PROCEDURE — 99999 PR PBB SHADOW E&M-EST. PATIENT-LVL III: CPT | Mod: PBBFAC,,, | Performed by: OBSTETRICS & GYNECOLOGY

## 2024-01-22 PROCEDURE — 3080F DIAST BP >= 90 MM HG: CPT | Mod: CPTII,,, | Performed by: OBSTETRICS & GYNECOLOGY

## 2024-01-22 PROCEDURE — 87491 CHLMYD TRACH DNA AMP PROBE: CPT | Performed by: OBSTETRICS & GYNECOLOGY

## 2024-01-22 PROCEDURE — 99214 OFFICE O/P EST MOD 30 MIN: CPT | Mod: S$PBB,,, | Performed by: OBSTETRICS & GYNECOLOGY

## 2024-01-22 PROCEDURE — 1159F MED LIST DOCD IN RCRD: CPT | Mod: CPTII,,, | Performed by: OBSTETRICS & GYNECOLOGY

## 2024-01-22 PROCEDURE — 3008F BODY MASS INDEX DOCD: CPT | Mod: CPTII,,, | Performed by: OBSTETRICS & GYNECOLOGY

## 2024-01-22 PROCEDURE — 1160F RVW MEDS BY RX/DR IN RCRD: CPT | Mod: CPTII,,, | Performed by: OBSTETRICS & GYNECOLOGY

## 2024-01-22 RX ORDER — VALACYCLOVIR HYDROCHLORIDE 500 MG/1
500 TABLET, FILM COATED ORAL DAILY
Qty: 90 TABLET | Refills: 1 | Status: SHIPPED | OUTPATIENT
Start: 2024-01-22

## 2024-01-22 RX ORDER — CLOTRIMAZOLE 1 %
CREAM (GRAM) TOPICAL 2 TIMES DAILY
Qty: 15 G | Refills: 0 | Status: SHIPPED | OUTPATIENT
Start: 2024-01-22

## 2024-01-22 RX ORDER — FLUOXETINE HYDROCHLORIDE 20 MG/1
20 CAPSULE ORAL DAILY
Qty: 90 CAPSULE | Refills: 1 | Status: SHIPPED | OUTPATIENT
Start: 2024-01-22

## 2024-01-22 NOTE — PROGRESS NOTES
OBSTETRIC HISTORY:   OB History          3    Para   2    Term   2            AB   1    Living   2         SAB        IAB   1    Ectopic        Multiple        Live Births   2                COMPREHENSIVE GYN HISTORY:  PAP History: Reports abnormal Paps. +HRHPV 2020  Infection History: Reports STDs: Herpes. Denies PID.  Benign History: Denies uterine fibroids. Denies ovarian cysts. Denies endometriosis.   Cancer History: Denies cervical cancer. Denies uterine cancer or hyperplasia. Denies ovarian cancer. Denies vulvar cancer or pre-cancer. Denies vaginal cancer or pre-cancer. Denies breast cancer. Denies colon cancer.  Sexual Activity History:  reports that she currently engages in sexual activity. She reports using the following method of birth control/protection: Implant.   Menstrual History:  Every 28 days, flows for 3 days. Moderate flow.  Dysmenorrhea History: Denies dysmenorrhea.  Contraception: Nexplanon  Inserted 10/19/22      HPI:   38 y.o.  Patient's last menstrual period was 2024 (exact date).   Patient is  here for her annual but too soon so having discharge / check STD (has a history).    ROS:  GENERAL: No weight gain or weight loss.   CHEST: No shortness of breath.   ABDOMEN: No abdominal pain, constipation, diarrhea, nausea, vomiting or rectal bleeding.   URINARY: No frequency, dysuria, hematuria, or burning on urination.  REPRODUCTIVE: See HPI.   BREASTS: No breast pain, lumps, or nipple discharge.   PSYCHIATRIC: No depression or anxiety.    PE:   BP (!) 126/90   Ht 5' (1.524 m)   Wt 78.1 kg (172 lb 2.9 oz)   LMP 2024 (Exact Date)   BMI 33.63 kg/m²   APPEARANCE: Well nourished, well developed, in no acute distress.  BREASTS: Symmetrical, no skin changes or visible lesions. No palpable masses, nipple discharge or adenopathy bilaterally.  PELVIC:   VULVA: No lesions. Normal female genitalia.  URETHRAL MEATUS: Normal size and location, no lesions, no prolapse.  URETHRA:  No masses, tenderness, prolapse or scarring.  VAGINA: Moist and well rugated, no discharge, no significant cystocele or rectocele.  CERVIX: No lesions and discharge.  UTERUS: Normal size, regular shape, mobile, non-tender, bladder base nontender.  ADNEXA: No masses or tenderness.    PROCEDURES:  Affirm  GC/CT    Assessment/Plan:  Vaginosis  Hx of std

## 2024-01-22 NOTE — LETTER
[facility] Occupational Health  [address] [address]  Phone: [phone]  Fax: [phone]  [facility]: [id number]    Pt Name: Deirdre Rodriguez  Injury Date: 01/17/2024   Employee ID: [id number] Date of First Treatment: 01/22/2024   Company: [employer]      Appointment Time:  Arrived: 9:03 AM    Provider: Lori Miller MD Time Out:10:05 AM      Office Treatment:   1. Facial laceration, subsequent encounter    2. Encounter related to worker's compensation claim    3. Fall, subsequent encounter    4. Contusion of left knee, subsequent encounter    5. Contusion of right knee, subsequent encounter    6. Avulsion of skin of face, subsequent encounter          Patient Instructions: Keep dressing clean/dry/covered      Restrictions: Regular Duty     Return Appointment: 1/26/2024 at 9:15 AM STONEY

## 2024-01-22 NOTE — PROGRESS NOTES
Subjective:      Patient ID: Deirdre Rodriguez is a 38 y.o. female.    Chief Complaint: Eye Injury (Right)    Patient's place of employment - [employer]   Patient's job title -    Date of injury - 1/17/23  Body part injured including left or right - Right Eye  Current work status per last visit - Regular Duty   Improved, same, or worse - Improved   Pain scale right now - 0/10     EC    Eye Injury   Pertinent negatives include no blurred vision, eye discharge, double vision or eye redness.       Constitution: Negative for activity change and generalized weakness.   Eyes:  Negative for eye discharge, eye pain, eye redness, vision loss, double vision, blurred vision and eyelid swelling.   Skin:  Positive for color change and laceration. Negative for bruising.     See MA note above. Begin MD note:    Deirdre Rodriguez is a 38 y.o. presenting for follow-up of facial laceration. She reports that she is feeling much improved today and feels able to return to work on regular duty.  Took muscle relaxer 1 time.  Notes that she is able to move her neck in all planes without pain today.  Requesting released to regular duty.    Objective:     Physical Exam  Vitals and nursing note reviewed.   HENT:      Head: Normocephalic. Laceration present.      Comments: Steri-Strips remain in place to right basal laceration.  No drainage or foul-smelling discharge.  No surrounding erythema.  Ecchymosis at the inferior lateral aspect of the right eye is improved from prior.  Eyes:      Conjunctiva/sclera: Conjunctivae normal.   Pulmonary:      Effort: Pulmonary effort is normal.   Musculoskeletal:      Cervical back: Normal range of motion and neck supple. No pain with movement.   Neurological:      General: No focal deficit present.      Mental Status: She is alert and oriented to person, place, and time.      Motor: Motor function is intact.      Coordination: Coordination is intact.   Psychiatric:          Attention and Perception: Attention normal.         Mood and Affect: Mood normal.         Speech: Speech normal.         Behavior: Behavior normal. Behavior is cooperative.         Thought Content: Thought content normal.        Assessment:      1. Facial laceration, subsequent encounter    2. Encounter related to worker's compensation claim    3. Fall, subsequent encounter    4. Contusion of left knee, subsequent encounter    5. Contusion of right knee, subsequent encounter    6. Avulsion of skin of face, subsequent encounter      Plan:     No signs of infection.  Continue wound care as previously discussed.  Agree with release to regular duty.  Follow up on Friday for wound check.    Diagnoses and plan discussed with the patient, as well as the expected course and duration of symptoms. Risks and benefits of any medication prescribed during this visit was explained, verbal instructions on use given. Clinic/Emergency department return precautions were given, can return to [facility] before scheduled follow-up appointment if notes worsening/aggravation of symptoms. All questions and concerns were addressed prior to discharge. Plan was developed with active input from the patient and they verbalized understanding of and agreement with the POC.   Note was dictated with voice recognition software, please excuse any grammatical errors.    I spent a total of 20 minutes on the day of the visit.  This includes face to face time and non-face to face time preparing to see the patient (eg, review of tests), obtaining and/or reviewing separately obtained history, documenting clinical information in the electronic or other health record, independently interpreting results and communicating results to the patient/family/caregiver, or care coordinator.       Patient Instructions: Keep dressing clean/dry/covered   Restrictions: Regular Duty  Follow up in about 4 days (around 1/26/2024).

## 2024-01-25 LAB
C TRACH DNA SPEC QL NAA+PROBE: NOT DETECTED
N GONORRHOEA DNA SPEC QL NAA+PROBE: NOT DETECTED

## 2024-01-26 ENCOUNTER — OFFICE VISIT (OUTPATIENT)
Dept: URGENT CARE | Facility: CLINIC | Age: 39
End: 2024-01-26
Payer: COMMERCIAL

## 2024-01-26 VITALS
BODY MASS INDEX: 27.48 KG/M2 | SYSTOLIC BLOOD PRESSURE: 126 MMHG | WEIGHT: 140 LBS | TEMPERATURE: 99 F | DIASTOLIC BLOOD PRESSURE: 70 MMHG | HEIGHT: 60 IN | HEART RATE: 78 BPM | RESPIRATION RATE: 16 BRPM | OXYGEN SATURATION: 97 %

## 2024-01-26 DIAGNOSIS — Z02.6 ENCOUNTER RELATED TO WORKER'S COMPENSATION CLAIM: ICD-10-CM

## 2024-01-26 DIAGNOSIS — S01.81XD FACIAL LACERATION, SUBSEQUENT ENCOUNTER: Primary | ICD-10-CM

## 2024-01-26 DIAGNOSIS — Z48.00 DRESSING CHANGE OR REMOVAL, NONSURGICAL WOUND: ICD-10-CM
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
## 2024-01-26 DIAGNOSIS — T14.8XXD ENCOUNTER FOR RE-CHECK OF LACERATION WOUND: ICD-10-CM

## 2024-01-26 DIAGNOSIS — S01.80XD: ICD-10-CM

## 2024-01-26 PROCEDURE — 99213 OFFICE O/P EST LOW 20 MIN: CPT | Mod: S$GLB,,, | Performed by: SURGERY

## 2024-01-26 NOTE — PROGRESS NOTES
Subjective:      Patient ID: Deirdre Rodriguez is a 38 y.o. female.    Chief Complaint: No chief complaint on file.    Patient's place of employment -   Patient's job title - [employer]  Date of Injury - 1/17/24  Body part injured - right eye  Current work status per last visit - regular  Improved, same, or worse - improved  Pain Scale right now (1-10) -  0    Constitution: Negative for activity change and generalized weakness.   Eyes:  Negative for eye discharge, eye pain, eye redness, vision loss, double vision, blurred vision and eyelid swelling.   Skin:  Positive for color change and laceration. Negative for bruising.     See MA note above. Begin MD note:    Deirdre Rodriguez is a 38 y.o. presenting for follow-up of facial laceration.Some tenderness over right cheek, but otherwise no issues.    Objective:     Physical Exam  Vitals and nursing note reviewed.   HENT:      Head: Normocephalic. Laceration present.      Comments: Laceration under right eye, healing well.  No surrounding swelling, erythema, ecchymosis.  Small scab on medial aspect of laceration.   Eyes:      Conjunctiva/sclera: Conjunctivae normal.   Pulmonary:      Effort: Pulmonary effort is normal.   Musculoskeletal:      Cervical back: Normal range of motion and neck supple. No pain with movement.   Neurological:      General: No focal deficit present.      Mental Status: She is alert and oriented to person, place, and time.      Motor: Motor function is intact.      Coordination: Coordination is intact.   Psychiatric:         Attention and Perception: Attention normal.         Mood and Affect: Mood normal.         Speech: Speech normal.         Behavior: Behavior normal. Behavior is cooperative.         Thought Content: Thought content normal.        Assessment:      1. Facial laceration, subsequent encounter    2. Encounter related to worker's compensation claim    3. Fall, subsequent encounter    4. Avulsion of skin of face, subsequent  encounter    5. Dressing change or removal, nonsurgical wound    6. Encounter for re-check of laceration wound      Plan:     No signs of infection.  Laceration is healing well.  Continue regular duty.  Discharged from [facility].    Diagnoses and plan discussed with the patient, all questions and concerns were addressed prior to discharge. Follow-up as needed if any reoccurrence of symptoms related to the present condition. Plan was developed with active input from the patient and they verbalized understanding of and agreement with the POC.   Note was dictated with voice recognition software, please excuse any grammatical errors.    I spent a total of 20 minutes on the day of the visit.  This includes face to face time and non-face to face time preparing to see the patient (eg, review of tests), obtaining and/or reviewing separately obtained history, documenting clinical information in the electronic or other health record, independently interpreting results and communicating results to the patient/family/caregiver, or care coordinator.           Restrictions: Regular Duty, Discharged from [facility]  No follow-ups on file.

## 2024-01-26 NOTE — LETTER
[facility]  [address] [address]  Phone: [phone]  Fax: [phone]  [facility]: [id number]    Pt Name: Deirdre Rodriguez  Injury Date: 01/17/2024   Employee ID: [id number] Date of Treatment: 01/26/2024   Company: [email or web address]      Appointment Time: 09:00 AM Arrived: 9:30 am   Provider: Lori Miller MD Time Out:10:15 am     Office Treatment:   1. Facial laceration, subsequent encounter    2. Encounter related to worker's compensation claim    3. Fall, subsequent encounter    4. Avulsion of skin of face, subsequent encounter    5. Dressing change or removal, nonsurgical wound    6. Encounter for re-check of laceration wound               Restrictions: Regular Duty, Discharged from Occupational Health     Return Appointment:if symptoms worsen or fail to improve

## 2024-04-25 ENCOUNTER — OCCUPATIONAL HEALTH (OUTPATIENT)
Dept: URGENT CARE | Facility: CLINIC | Age: 39
End: 2024-04-25

## 2024-04-25 DIAGNOSIS — Z13.9 ENCOUNTER FOR SCREENING: Primary | ICD-10-CM

## 2024-04-25 PROCEDURE — 99199 UNLISTED SPECIAL SVC PX/RPRT: CPT | Mod: S$GLB,,,

## 2024-04-25 PROCEDURE — 80305 DRUG TEST PRSMV DIR OPT OBS: CPT | Mod: S$GLB,,,

## 2024-09-06 ENCOUNTER — TELEPHONE (OUTPATIENT)
Dept: OBSTETRICS AND GYNECOLOGY | Facility: CLINIC | Age: 39
End: 2024-09-06
Payer: MEDICAID

## 2024-09-06 NOTE — TELEPHONE ENCOUNTER
----- Message from Gallo Davila sent at 9/6/2024 12:11 PM CDT -----  .Type:  RX Refill Request    Who Called: pt  Refill or New Rx:refill  RX Name and Strength:valACYclovir (VALTREX) 500 MG tablet  How is the patient currently taking it? (ex. 1XDay): Take 1 tablet (500 mg total) by mouth once daily. - Oral  Is this a 30 day or 90 day RX:90 tablet  Preferred Pharmacy with phone number:[facility] [address]# [phone]  Local or Mail Order:local  Ordering Provider:Kathleen  Would the patient rather a call back or a response via [facility]? Call back  Best Call Back Number:[phone]  Additional Information:        Refill or New Rx:refill  RX Name and Strength:FLUoxetine 20 MG capsule  How is the patient currently taking it? (ex. 1XDay):Take 1 capsule (20 mg total) by mouth once daily. - Oral  Is this a 30 day or 90 day RX:90 capsule

## 2024-09-06 NOTE — TELEPHONE ENCOUNTER
Called pt to schedule her for a annual because its been a year. Pt doesn't have insurance at this time, but wants to asked if you could refill her medication. Pt  will schedule as soon as she gets her insurance back please advise.

## 2024-11-20 ENCOUNTER — TELEPHONE (OUTPATIENT)
Dept: OBSTETRICS AND GYNECOLOGY | Facility: CLINIC | Age: 39
End: 2024-11-20
Payer: MEDICAID

## 2024-11-20 NOTE — TELEPHONE ENCOUNTER
----- Message from Jazmin sent at 11/20/2024  9:37 AM CST -----  Type:  Needs Medical Advice    Who Called: pt    Would the patient rather a call back or a response via [facility]? Call   Best Call Back Number: [phone]  Additional Information: pt requesting a call back to discuss insurance [facility] care before scheduling

## 2024-11-24 ENCOUNTER — HOSPITAL ENCOUNTER (EMERGENCY)
Facility: HOSPITAL | Age: 39
Discharge: HOME | End: 2024-11-24
Attending: EMERGENCY MEDICINE
Payer: COMMERCIAL

## 2024-11-24 VITALS
RESPIRATION RATE: 15 BRPM | HEART RATE: 77 BPM | DIASTOLIC BLOOD PRESSURE: 81 MMHG | HEIGHT: 60 IN | TEMPERATURE: 99 F | BODY MASS INDEX: 27.48 KG/M2 | SYSTOLIC BLOOD PRESSURE: 131 MMHG | WEIGHT: 140 LBS | OXYGEN SATURATION: 100 %

## 2024-11-24 DIAGNOSIS — N20.1 URETEROLITHIASIS: Primary | ICD-10-CM

## 2024-11-24 DIAGNOSIS — N94.89 ADNEXAL MASS: ICD-10-CM

## 2024-11-24 DIAGNOSIS — R93.5 ABNORMAL CT OF THE ABDOMEN: ICD-10-CM

## 2024-11-24 PROBLEM — B00.9 HERPES SIMPLEX VIRAL INFECTION: Status: ACTIVE | Noted: 2024-11-24

## 2024-11-24 PROBLEM — E66.811 CLASS 1 OBESITY: Status: ACTIVE | Noted: 2024-11-19

## 2024-11-24 LAB
ALBUMIN SERPL BCP-MCNC: 3.7 G/DL (ref 3.5–5.2)
ALP SERPL-CCNC: 78 U/L (ref 40–150)
ALT SERPL W/O P-5'-P-CCNC: 22 U/L (ref 10–44)
ANION GAP SERPL CALC-SCNC: 9 MMOL/L (ref 8–16)
AST SERPL-CCNC: 16 U/L (ref 10–40)
B-HCG UR QL: NEGATIVE
BACTERIA #/AREA URNS AUTO: ABNORMAL /HPF
BASOPHILS # BLD AUTO: 0.05 K/UL (ref 0–0.2)
BASOPHILS NFR BLD: 0.7 % (ref 0–1.9)
BILIRUB SERPL-MCNC: 0.2 MG/DL (ref 0.1–1)
BILIRUB UR QL STRIP: NEGATIVE
BUN SERPL-MCNC: 11 MG/DL (ref 6–20)
CALCIUM SERPL-MCNC: 9.2 MG/DL (ref 8.7–10.5)
CHLORIDE SERPL-SCNC: 105 MMOL/L (ref 95–110)
CLARITY UR REFRACT.AUTO: CLEAR
CO2 SERPL-SCNC: 24 MMOL/L (ref 23–29)
COLOR UR AUTO: COLORLESS
CREAT SERPL-MCNC: 0.9 MG/DL (ref 0.5–1.4)
CTP QC/QA: YES
DIFFERENTIAL METHOD BLD: ABNORMAL
EOSINOPHIL # BLD AUTO: 0.2 K/UL (ref 0–0.5)
EOSINOPHIL NFR BLD: 3.4 % (ref 0–8)
ERYTHROCYTE [DISTWIDTH] IN BLOOD BY AUTOMATED COUNT: 12.2 % (ref 11.5–14.5)
EST. GFR  (NO RACE VARIABLE): >60 ML/MIN/1.73 M^2
GLUCOSE SERPL-MCNC: 84 MG/DL (ref 70–110)
GLUCOSE UR QL STRIP: NEGATIVE
HCT VFR BLD AUTO: 37 % (ref 37–48.5)
HGB BLD-MCNC: 13.2 G/DL (ref 12–16)
HGB UR QL STRIP: ABNORMAL
IMM GRANULOCYTES # BLD AUTO: 0.05 K/UL (ref 0–0.04)
IMM GRANULOCYTES NFR BLD AUTO: 0.7 % (ref 0–0.5)
KETONES UR QL STRIP: NEGATIVE
LEUKOCYTE ESTERASE UR QL STRIP: NEGATIVE
LYMPHOCYTES # BLD AUTO: 2 K/UL (ref 1–4.8)
LYMPHOCYTES NFR BLD: 28.1 % (ref 18–48)
MCH RBC QN AUTO: 31.7 PG (ref 27–31)
MCHC RBC AUTO-ENTMCNC: 35.7 G/DL (ref 32–36)
MCV RBC AUTO: 89 FL (ref 82–98)
MICROSCOPIC COMMENT: ABNORMAL
MONOCYTES # BLD AUTO: 0.4 K/UL (ref 0.3–1)
MONOCYTES NFR BLD: 5.7 % (ref 4–15)
NEUTROPHILS # BLD AUTO: 4.3 K/UL (ref 1.8–7.7)
NEUTROPHILS NFR BLD: 61.4 % (ref 38–73)
NITRITE UR QL STRIP: NEGATIVE
NRBC BLD-RTO: 0 /100 WBC
PH UR STRIP: 6 [PH] (ref 5–8)
PLATELET # BLD AUTO: 322 K/UL (ref 150–450)
PMV BLD AUTO: 9.7 FL (ref 9.2–12.9)
POTASSIUM SERPL-SCNC: 3.9 MMOL/L (ref 3.5–5.1)
PROT SERPL-MCNC: 7.6 G/DL (ref 6–8.4)
PROT UR QL STRIP: NEGATIVE
RBC # BLD AUTO: 4.17 M/UL (ref 4–5.4)
RBC #/AREA URNS AUTO: 28 /HPF (ref 0–4)
SODIUM SERPL-SCNC: 138 MMOL/L (ref 136–145)
SP GR UR STRIP: 1.01 (ref 1–1.03)
SQUAMOUS #/AREA URNS AUTO: 2 /HPF
URN SPEC COLLECT METH UR: ABNORMAL
WBC # BLD AUTO: 7.05 K/UL (ref 3.9–12.7)
WBC #/AREA URNS AUTO: 2 /HPF (ref 0–5)

## 2024-11-24 PROCEDURE — 80053 COMPREHEN METABOLIC PANEL: CPT | Performed by: PHYSICIAN ASSISTANT

## 2024-11-24 PROCEDURE — 25000003 PHARM REV CODE 250: Performed by: PHYSICIAN ASSISTANT

## 2024-11-24 PROCEDURE — 99284 EMERGENCY DEPT VISIT MOD MDM: CPT | Mod: 25

## 2024-11-24 PROCEDURE — 81025 URINE PREGNANCY TEST: CPT | Performed by: PHYSICIAN ASSISTANT

## 2024-11-24 PROCEDURE — 81001 URINALYSIS AUTO W/SCOPE: CPT | Performed by: PHYSICIAN ASSISTANT

## 2024-11-24 PROCEDURE — 85025 COMPLETE CBC W/AUTO DIFF WBC: CPT | Performed by: PHYSICIAN ASSISTANT

## 2024-11-24 RX ORDER — KETOROLAC TROMETHAMINE 30 MG/ML
10 INJECTION, SOLUTION INTRAMUSCULAR; INTRAVENOUS
Status: DISCONTINUED | OUTPATIENT
Start: 2024-11-24 | End: 2024-11-24 | Stop reason: HOSPADM

## 2024-11-24 RX ORDER — ONDANSETRON 4 MG/1
4 TABLET, ORALLY DISINTEGRATING ORAL EVERY 6 HOURS PRN
Qty: 15 TABLET | Refills: 0 | Status: SHIPPED | OUTPATIENT
Start: 2024-11-24

## 2024-11-24 RX ORDER — ONDANSETRON HYDROCHLORIDE 2 MG/ML
4 INJECTION, SOLUTION INTRAVENOUS
Status: DISCONTINUED | OUTPATIENT
Start: 2024-11-24 | End: 2024-11-24 | Stop reason: HOSPADM

## 2024-11-24 RX ORDER — KETOROLAC TROMETHAMINE 10 MG/1
10 TABLET, FILM COATED ORAL EVERY 6 HOURS PRN
Qty: 20 TABLET | Refills: 0 | Status: SHIPPED | OUTPATIENT
Start: 2024-11-24 | End: 2024-11-29

## 2024-11-24 RX ORDER — TAMSULOSIN HYDROCHLORIDE 0.4 MG/1
0.8 CAPSULE ORAL
Status: COMPLETED | OUTPATIENT
Start: 2024-11-24 | End: 2024-11-24

## 2024-11-24 RX ORDER — TAMSULOSIN HYDROCHLORIDE 0.4 MG/1
0.4 CAPSULE ORAL DAILY
Qty: 30 CAPSULE | Refills: 0 | Status: SHIPPED | OUTPATIENT
Start: 2024-11-24 | End: 2024-12-24

## 2024-11-24 RX ADMIN — TAMSULOSIN HYDROCHLORIDE 0.8 MG: 0.4 CAPSULE ORAL at 03:11

## 2024-11-24 NOTE — Clinical Note
"Deirdre Rodriguez (Ashley) was seen and treated in our emergency department on 11/24/2024.  She may return to work on 11/27/2024.       If you have any questions or concerns, please don't hesitate to call.       RN    "

## 2024-11-24 NOTE — ED TRIAGE NOTES
Deirdre Rodriguez, a 39 y.o. female presents to the ED w/ complaint of left flank pain. Patient was seen at an urgent care Wednesday. Patient states that a possible kidney stone that was too large to pass was seen. Patient reports that microscopic RBC in her urine was found and has been noticing blood every time she wipes. Patient denies dysuria, fever, or chills.     Triage note:  Chief Complaint   Patient presents with    left flank pain     Pt reports left flank pain since Tuesday. Pt has history of kidney stones     Review of patient's allergies indicates:  No Known Allergies  Past Medical History:   Diagnosis Date    Cervical high risk HPV (human papillomavirus) test positive 04/07/2021    Herpes simplex without mention of complication     Kidney stones 9/2012

## 2024-11-24 NOTE — Clinical Note
"Deirdre Rodriguez (Ashley) was seen and treated in our emergency department on 11/24/2024.  She may return to work on 11/29/2024.       If you have any questions or concerns, please don't hesitate to call.       RN    "

## 2024-11-24 NOTE — ED PROVIDER NOTES
Encounter Date: 2024       History     Chief Complaint   Patient presents with    left flank pain     Pt reports left flank pain since Tuesday. Pt has history of kidney stones     31-year-old female with history of kidney stones presents for left lower quadrant abdominal pain for several days duration.  Pain is worse with movement, she denies any palliating factors.  She had some nausea and vomiting the other day which has since resolved.  She was seen at urgent care clinic and had a UA which showed microscopic hematuria and an abdominal x-ray which showed bowel gas.  She denies dysuria, adriana hematuria, fevers/chills or flank pain.  She does not remember if this feels similar to her prior kidney stones.      Review of patient's allergies indicates:  No Known Allergies  Past Medical History:   Diagnosis Date    Cervical high risk HPV (human papillomavirus) test positive 2021    Herpes simplex without mention of complication     Kidney stones 2012     Past Surgical History:   Procedure Laterality Date     SECTION      COLD KNIFE CONIZATION OF CERVIX N/A 2021    Procedure: CONE BIOPSY, CERVIX, USING COLD KNIFE;  Surgeon: Fanta Wang MD;  Location: [facility];  Service: OB/GYN;  Laterality: N/A;    Renal Stent Placement  2012    Renal Stent Removal  10/2012     Family History   Problem Relation Name Age of Onset    Cervical cancer Paternal Grandmother      Cervical cancer Maternal Grandmother      Cancer Sister          thyroid    Bipolar disorder Maternal Aunt Karen     Breast cancer Maternal Aunt Karen 59    Cervical cancer Paternal Aunt      Colon cancer Neg Hx      Ovarian cancer Neg Hx       Social History     Tobacco Use    Smoking status: Never    Smokeless tobacco: Never   Substance Use Topics    Alcohol use: No    Drug use: No     Review of Systems    Physical Exam     Initial Vitals [24 1323]   BP Pulse Resp Temp SpO2   132/62 75 18 99.2 °F (37.3 °C) 97 %      MAP       --          Physical Exam    Nursing note and vitals reviewed.  Constitutional: She appears well-developed and well-nourished. She is not diaphoretic. No distress.   HENT:   Head: Normocephalic and atraumatic.   Cardiovascular:  Normal rate, regular rhythm, normal heart sounds and intact distal pulses.     Exam reveals no gallop and no friction rub.       No murmur heard.  Pulmonary/Chest: Breath sounds normal. No respiratory distress. She has no wheezes. She has no rhonchi. She has no rales. She exhibits no tenderness.   Abdominal: Abdomen is soft. Bowel sounds are normal. She exhibits no distension and no mass. There is no abdominal tenderness. There is no rebound and no guarding.   Musculoskeletal:         General: Normal range of motion.     Neurological: She is alert.   Skin: Skin is warm and dry.   Psychiatric: She has a normal mood and affect.         ED Course   Procedures  Labs Reviewed   URINALYSIS, REFLEX TO URINE CULTURE - Abnormal       Result Value    Specimen UA Urine, Clean Catch      Color, UA Colorless (*)     Appearance, UA Clear      pH, UA 6.0      Specific Gravity, UA 1.010      Protein, UA Negative      Glucose, UA Negative      Ketones, UA Negative      Bilirubin (UA) Negative      Occult Blood UA 3+ (*)     Nitrite, UA Negative      Leukocytes, UA Negative      Narrative:     Specimen Source->Urine   CBC W/ AUTO DIFFERENTIAL - Abnormal    WBC 7.05      RBC 4.17      Hemoglobin 13.2      Hematocrit 37.0      MCV 89      MCH 31.7 (*)     MCHC 35.7      RDW 12.2      Platelets 322      MPV 9.7      Immature Granulocytes 0.7 (*)     Gran # (ANC) 4.3      Immature Grans (Abs) 0.05 (*)     Lymph # 2.0      Mono # 0.4      Eos # 0.2      Baso # 0.05      nRBC 0      Gran % 61.4      Lymph % 28.1      Mono % 5.7      Eosinophil % 3.4      Basophil % 0.7      Differential Method Automated     URINALYSIS MICROSCOPIC - Abnormal    RBC, UA 28 (*)     WBC, UA 2      Bacteria Rare      Squam Epithel, UA 2       Microscopic Comment SEE COMMENT      Narrative:     Specimen Source->Urine   COMPREHENSIVE METABOLIC PANEL    Sodium 138      Potassium 3.9      Chloride 105      CO2 24      Glucose 84      BUN 11      Creatinine 0.9      Calcium 9.2      Total Protein 7.6      Albumin 3.7      Total Bilirubin 0.2      Alkaline Phosphatase 78      AST 16      ALT 22      eGFR >60.0      Anion Gap 9     POCT URINE PREGNANCY    POC Preg Test, Ur Negative       Acceptable Yes            Imaging Results               CT Renal Stone Study ABD Pelvis WO (Final result)  Result time 11/24/24 15:46:34      Final result by Jakub Benson MD (11/24/24 15:46:34)                   Impression:      1. Moderate hydronephrosis and hydroureter on the left secondary to a 5 mm stone at the left UVJ.  Follow-up recommended.  2. Hepatomegaly.  3. 4.5 cm fluid density right adnexal mass suggesting a right ovarian cyst.  Recommend sonographic surveillance.  4. Slightly more superior in the right upper pelvis and right lower quadrant is an ovoid homogeneous thin walled fluid density mass measuring 2.4 x 3.7 x 5.6 cm.  The lesion is indeterminate, possibly a GI duplication cyst. This abuts the cecum though separate from the appendix which appears within normal limits in the right lower quadrant.  Cystic or mucinous neoplasm is not completely excluded. Follow-up and surveillance is recommended.  5. Small fat containing umbilical hernia.  6. Possible constipation.  7. This report was flagged in Epic as abnormal.      Electronically signed by: Jakub Benson  Date:    11/24/2024  Time:    15:46               Narrative:    EXAMINATION:  CT RENAL STONE STUDY ABD PELVIS WO    CLINICAL HISTORY:  Flank pain, kidney stone suspected;LLQ pain;    TECHNIQUE:  Low dose axial images, sagittal and coronal reformations were obtained from the lung bases to the pubic symphysis.  Contrast was not administered.    COMPARISON:  None    FINDINGS:  Heart: Normal  in size. No pericardial effusion.    Lung Bases: Well aerated, without consolidation or pleural fluid.    Liver: The liver is enlarged.  No focal abnormality.    Gallbladder: No calcified gallstones.    Bile Ducts: No evidence of dilated ducts.    Pancreas: No mass or peripancreatic fat stranding.    Spleen: Unremarkable.    Adrenals: Unremarkable.    Kidneys/ Ureters: Moderate hydronephrosis and hydroureter on the left secondary to a 5 mm stone at the left UVJ.  Follow-up recommended.  No stones elsewhere bilaterally.    Right kidney is within normal limits.    Bladder: Urinary bladder is incompletely distended.  Possible mild wall thickening.    Reproductive organs: Uterus is midline.  Left ovary is normal in size.    4.5 cm fluid density right adnexal mass suggesting right ovarian cyst.    Slightly more superior in the right upper pelvis and right lower quadrant is an ovoid homogeneous fluid density mass measuring approximately 2.4 x 3.7 x 5.6 cm on axial 105 and coronal 78.  This lesion is indeterminate possibly a GI duplication cyst.    This abuts the cecum though separate from the appendix which appears within normal limits in the right lower quadrant.  Cystic or mucinous neoplasm is not completely excluded.  Follow-up and surveillance is recommended.    GI Tract/Mesentery: No evidence of bowel obstruction or inflammation.  Moderate retained feces in the colon and rectum.    Peritoneal Space: No ascites. No free air.    Retroperitoneum: No significant adenopathy.    Abdominal wall: Small fat containing umbilical hernia.    Vasculature: No significant atherosclerosis or aneurysm.    Bones: No acute fracture.                                       Medications   ondansetron injection 4 mg (4 mg Intravenous Not Given 11/24/24 1415)   ketorolac injection 9.999 mg (0 mg Intravenous Hold 11/24/24 1415)   tamsulosin 24 hr capsule 0.8 mg (0.8 mg Oral Given 11/24/24 1500)     Medical Decision Making  39-year-old female  presenting for left lower quadrant abdominal pain.  Her vitals are normal and she is nontoxic appearing.    Differential diagnosis:  Ureterolithiasis   Diverticulitis   Ovarian cyst rupture  Doubt ovarian torsion, patient is very comfortable    Will check labs, give analgesics, antiemetics and reassess.    Patient declined analgesics but states that she was feeling well.  Labs show normal kidney function, no leukocytosis with microscopic hematuria.  CT does show 5 mm stone in the left UVJ.  Also shows incidental findings of right adnexal cyst and a cystic mass abutting her cecum.  I discussed these results with the patient.  Will refer to Urology for follow up as well as gynecology regarding her neck supple mass and primary care regarding her mass near her cecum. Stressed the importance of follow-up, strict ED return precautions given.  Patient voiced understanding and is comfortable with discharge. I discussed this patient with my supervising physician.      Amount and/or Complexity of Data Reviewed  Labs: ordered. Decision-making details documented in ED Course.  Radiology: ordered and independent interpretation performed. Decision-making details documented in ED Course.    Risk  Prescription drug management.               ED Course as of 11/24/24 1710   Sun Nov 24, 2024   1418 hCG Qualitative, Urine: Negative [CC]   1445 CT Renal Stone Study ABD Pelvis WO  Per my independent interpretation, there is left-sided hydronephrosis secondary to a stone which appears to be in the bladder [CC]   1516 WBC: 7.05 [CC]   1526 Creatinine: 0.9 [CC]   1534 RBC, UA(!): 28 [CC]   1534 Leukocyte Esterase, UA: Negative [CC]   1559 Case discussed with colorectal surgery fellow Dr. Choi, who recommends primary care follow-up [CC]      ED Course User Index  [CC] Jacki Mckeon PA-C                             Clinical Impression:  Final diagnoses:  [N20.1] Ureterolithiasis (Primary)  [N94.89] Adnexal mass  [R93.5] Abnormal CT  of the abdomen          ED Disposition Condition    Discharge Stable          ED Prescriptions       Medication Sig Dispense Start Date End Date Auth. Provider    ketorolac (TORADOL) 10 mg tablet Take 1 tablet (10 mg total) by mouth every 6 (six) hours as needed for Pain. 20 tablet 11/24/2024 11/29/2024 Jacki Mckeon PA-C    ondansetron (ZOFRAN-ODT) 4 MG TbDL Take 1 tablet (4 mg total) by mouth every 6 (six) hours as needed (Nausea). 15 tablet 11/24/2024 -- Jacki Mckeon PA-C    tamsulosin (FLOMAX) 0.4 mg Cap Take 1 capsule (0.4 mg total) by mouth once daily. 30 capsule 11/24/2024 12/24/2024 Jacki Mckeon PA-C          Follow-up Information       Follow up With Specialties Details Why Contact Info Additional Information    Medicine, [facility] - Family Family Medicine Schedule an appointment as soon as possible for a visit in 1 week To establish a primary care doctor [address] [facility] [address]  [phone]       [facility] Primary [facility] Bl Internal Medicine Schedule an appointment as soon as possible for a visit in 1 week To establish a primary care doctor [address] [facility] [address] [phone] [facility] Please park in surface lot and check in at central registration desk    Fanta Wang MD Obstetrics, Obstetrics and Gynecology Schedule an appointment as soon as possible for a visit in 1 week To discuss ovarian cyst [address] [facility] [address] [phone]       Rectal/Urology, [facility] - Nephrology/Colon & Nephrology, Colon and Rectal Surgery, Urology Schedule an appointment as soon as possible for a visit in 1 week  [address] [facility] [address]  [phone]       [facility] - Emergency Dept Emergency Medicine Go to  If symptoms worsen [address] [facility] [id number] [phone]              Jacki Mckeon PA-C  11/24/24 8697

## 2024-11-24 NOTE — DISCHARGE INSTRUCTIONS
Diagnosis: Kidney stone, adnexal mass, abnormal CT of the abdomen/pelvis    You have a 5 mm kidney stone on the left side.  This is very close to the bladder and will likely pass on its own.  I am prescribing Flomax to help the stone mass as well as medicine for pain and nausea that you can take as needed.  You should strain your urine as we discussed and make sure to stay hydrated.    Your CT scan also had several incidental findings.  Your scan showed what is most likely a large ovarian cyst on the right side, please discuss this with your gynecologist.  They may want to obtain a ultrasound for follow-up.  Your scan also showed a cystic looking mass next to your cecum.  Please discuss this with your primary care doctor for further workup.    I sent referrals to our primary care clinic as well as to Urology at [facility] for follow-up and gynecology.  Please call to schedule follow-up appointments.  If you start to have any worsening symptoms, new symptoms such as inability to hold down fluids, or fever please return to the emergency department.    Imaging Results               CT Renal Stone Study ABD Pelvis WO (Final result)  Result time 11/24/24 15:46:34      Final result by Jakub Benson MD (11/24/24 15:46:34)                   Impression:      1. Moderate hydronephrosis and hydroureter on the left secondary to a 5 mm stone at the left UVJ.  Follow-up recommended.  2. Hepatomegaly.  3. 4.5 cm fluid density right adnexal mass suggesting a right ovarian cyst.  Recommend sonographic surveillance.  4. Slightly more superior in the right upper pelvis and right lower quadrant is an ovoid homogeneous thin walled fluid density mass measuring 2.4 x 3.7 x 5.6 cm.  The lesion is indeterminate, possibly a GI duplication cyst. This abuts the cecum though separate from the appendix which appears within normal limits in the right lower quadrant.  Cystic or mucinous neoplasm is not completely excluded. Follow-up and surveillance  is recommended.  5. Small fat containing umbilical hernia.  6. Possible constipation.  7. This report was flagged in Epic as abnormal.      Electronically signed by: Jakub Monsalve  Date:    11/24/2024  Time:    15:46               Narrative:    EXAMINATION:  CT RENAL STONE STUDY ABD PELVIS WO    CLINICAL HISTORY:  Flank pain, kidney stone suspected;LLQ pain;    TECHNIQUE:  Low dose axial images, sagittal and coronal reformations were obtained from the lung bases to the pubic symphysis.  Contrast was not administered.    COMPARISON:  None    FINDINGS:  Heart: Normal in size. No pericardial effusion.    Lung Bases: Well aerated, without consolidation or pleural fluid.    Liver: The liver is enlarged.  No focal abnormality.    Gallbladder: No calcified gallstones.    Bile Ducts: No evidence of dilated ducts.    Pancreas: No mass or peripancreatic fat stranding.    Spleen: Unremarkable.    Adrenals: Unremarkable.    Kidneys/ Ureters: Moderate hydronephrosis and hydroureter on the left secondary to a 5 mm stone at the left UVJ.  Follow-up recommended.  No stones elsewhere bilaterally.    Right kidney is within normal limits.    Bladder: Urinary bladder is incompletely distended.  Possible mild wall thickening.    Reproductive organs: Uterus is midline.  Left ovary is normal in size.    4.5 cm fluid density right adnexal mass suggesting right ovarian cyst.    Slightly more superior in the right upper pelvis and right lower quadrant is an ovoid homogeneous fluid density mass measuring approximately 2.4 x 3.7 x 5.6 cm on axial 105 and coronal 78.  This lesion is indeterminate possibly a GI duplication cyst.    This abuts the cecum though separate from the appendix which appears within normal limits in the right lower quadrant.  Cystic or mucinous neoplasm is not completely excluded.  Follow-up and surveillance is recommended.    GI Tract/Mesentery: No evidence of bowel obstruction or inflammation.  Moderate retained feces  in the colon and rectum.    Peritoneal Space: No ascites. No free air.    Retroperitoneum: No significant adenopathy.    Abdominal wall: Small fat containing umbilical hernia.    Vasculature: No significant atherosclerosis or aneurysm.    Bones: No acute fracture.

## 2024-11-26 ENCOUNTER — TELEPHONE (OUTPATIENT)
Dept: OBSTETRICS AND GYNECOLOGY | Facility: CLINIC | Age: 39
End: 2024-11-26
Payer: COMMERCIAL

## 2024-11-26 NOTE — PLAN OF CARE
Urology   ambulatory referral faxed to [facility]  [phone].       Mark Ramos LMSW  Case Management  Emergency Department  [phone]

## 2024-11-26 NOTE — TELEPHONE ENCOUNTER
----- Message from Kei sent at 11/26/2024  7:54 AM CST -----  Contact: PT  Type: Requesting to speak with nurse        Who Called: PT  Regarding: PT WENT TO ER YESTERDAY FOR STONES. WOULD LIKE FOR DOCTOR TO SEE HER TEST RESULTS AND ADVISE HER WHAT SHE NEEDS TO DO.  Would the patient rather a call back or a response via [facility]? Call back  Best Call Back Number: [phone]   Additional Information:

## 2024-11-26 NOTE — TELEPHONE ENCOUNTER
----- Message from Carolynn sent at 11/26/2024  9:51 AM CST -----  Type:  Patient Returning Call    Who Called: pt     Who Left Message for Patient: raisa    Does the patient know what this is regarding?:    Would the patient rather a call back or a response via [facility]? call    Best Call Back Number:[phone] (home)       Additional Information:

## 2024-12-02 ENCOUNTER — OFFICE VISIT (OUTPATIENT)
Dept: OBSTETRICS AND GYNECOLOGY | Facility: CLINIC | Age: 39
End: 2024-12-02
Payer: COMMERCIAL

## 2024-12-02 VITALS
WEIGHT: 176.19 LBS | BODY MASS INDEX: 34.41 KG/M2 | DIASTOLIC BLOOD PRESSURE: 88 MMHG | SYSTOLIC BLOOD PRESSURE: 133 MMHG

## 2024-12-02 DIAGNOSIS — Z01.419 WELL WOMAN EXAM WITH ROUTINE GYNECOLOGICAL EXAM: Primary | ICD-10-CM

## 2024-12-02 DIAGNOSIS — N94.89 ADNEXAL MASS: ICD-10-CM

## 2024-12-02 PROCEDURE — 3008F BODY MASS INDEX DOCD: CPT | Mod: CPTII,S$GLB,, | Performed by: OBSTETRICS & GYNECOLOGY

## 2024-12-02 PROCEDURE — 3079F DIAST BP 80-89 MM HG: CPT | Mod: CPTII,S$GLB,, | Performed by: OBSTETRICS & GYNECOLOGY

## 2024-12-02 PROCEDURE — 3075F SYST BP GE 130 - 139MM HG: CPT | Mod: CPTII,S$GLB,, | Performed by: OBSTETRICS & GYNECOLOGY

## 2024-12-02 PROCEDURE — 1159F MED LIST DOCD IN RCRD: CPT | Mod: CPTII,S$GLB,, | Performed by: OBSTETRICS & GYNECOLOGY

## 2024-12-02 PROCEDURE — 99395 PREV VISIT EST AGE 18-39: CPT | Mod: S$GLB,,, | Performed by: OBSTETRICS & GYNECOLOGY

## 2024-12-02 PROCEDURE — 99999 PR PBB SHADOW E&M-EST. PATIENT-LVL III: CPT | Mod: PBBFAC,,, | Performed by: OBSTETRICS & GYNECOLOGY

## 2024-12-02 RX ORDER — METRONIDAZOLE 500 MG/1
500 TABLET ORAL EVERY 12 HOURS
Qty: 14 TABLET | Refills: 1 | Status: SHIPPED | OUTPATIENT
Start: 2024-12-02

## 2024-12-02 RX ORDER — FLUOXETINE HYDROCHLORIDE 20 MG/1
20 CAPSULE ORAL DAILY
Qty: 90 CAPSULE | Refills: 3 | Status: SHIPPED | OUTPATIENT
Start: 2024-12-02

## 2024-12-02 RX ORDER — FLUCONAZOLE 100 MG/1
100 TABLET ORAL
Qty: 3 TABLET | Refills: 1 | Status: SHIPPED | OUTPATIENT
Start: 2024-12-02

## 2024-12-02 RX ORDER — VALACYCLOVIR HYDROCHLORIDE 500 MG/1
500 TABLET, FILM COATED ORAL DAILY
Qty: 90 TABLET | Refills: 3 | Status: SHIPPED | OUTPATIENT
Start: 2024-12-02

## 2024-12-02 NOTE — PROGRESS NOTES
OBSTETRIC HISTORY:   OB History          3    Para   2    Term   2            AB   1    Living   2         SAB        IAB   1    Ectopic        Multiple        Live Births   2                COMPREHENSIVE GYN HISTORY:  PAP History: Reports abnormal Paps. +HRHPV 2020  Infection History: Reports STDs: Herpes. Denies PID.  Benign History: Denies uterine fibroids. Denies ovarian cysts. Denies endometriosis.   Cancer History: Denies cervical cancer. Denies uterine cancer or hyperplasia. Denies ovarian cancer. Denies vulvar cancer or pre-cancer. Denies vaginal cancer or pre-cancer. Denies breast cancer. Denies colon cancer.  Sexual Activity History:  reports that she currently engages in sexual activity. She reports using the following method of birth control/protection: Implant.   Menstrual History:  Every 28 days, flows for 3 days. Moderate flow.  Dysmenorrhea History: Denies dysmenorrhea.  Contraception: Nexplanon  Inserted 10/19/22        HPI:   39 y.o.  No LMP recorded. Patient has had an implant.   Patient is  here for her annual gynecologic exam.  She had recent visit to ED that had abnormal CT scan. She had kidney stones. Her insurance is no longer covered at this office. She denies bladder, breast and bowel complaints.    ROS:  GENERAL: No weight gain or weight loss.   CHEST: No shortness of breath.   ABDOMEN: No abdominal pain, constipation, diarrhea, nausea, vomiting or rectal bleeding.   URINARY: No frequency, dysuria, hematuria, or burning on urination.  REPRODUCTIVE: See HPI.   BREASTS: No breast pain, lumps, or nipple discharge.   PSYCHIATRIC: No depression or anxiety.    PE:   /88   Wt 79.9 kg (176 lb 3.2 oz)   BMI 34.41 kg/m²   APPEARANCE: Well nourished, well developed, in no acute distress.  NECK: Neck symmetric without  thyromegaly.  NODES: No inguinal, cervical lymph node enlargement.  CHEST: Lungs clear to auscultation.  HEART: Regular rate and rhythm, no murmurs, rubs or  gallops.  ABDOMEN: Soft. No tenderness or masses. No hernias.  BREASTS: Symmetrical, no skin changes or visible lesions. No palpable masses, nipple discharge or adenopathy bilaterally.  PELVIC:   VULVA: No lesions. Normal female genitalia.  URETHRAL MEATUS: Normal size and location, no lesions, no prolapse.  URETHRA: No masses, tenderness, prolapse or scarring.  VAGINA: Moist and well rugated, yeast like infection, no significant cystocele or rectocele.  CERVIX: No lesions and discharge.  UTERUS: Normal size, regular shape, mobile, non-tender, bladder base nontender.  ADNEXA: No masses or tenderness.    PROCEDURES:  Pap smear-+ HPV---normal 6/2023      Assessment:  Normal Gynecologic Exam  Yeast infection  Recent kidney stones  U/A in office was negative  Right ovarian cyst--not well palpated  Possible GI duplication cyst or mucinous neoplasm      FROM 11/24/24 CT Renal Stone Study:  Reproductive organs: Uterus is midline.  Left ovary is normal in size.  4.5 cm fluid density right adnexal mass suggesting right ovarian cyst.     Slightly more superior in the right upper pelvis and right lower quadrant is an ovoid homogeneous fluid density mass measuring approximately 2.4 x 3.7 x 5.6 cm on axial 105 and coronal 78.  This lesion is indeterminate possibly a GI duplication cyst.  This abuts the cecum though separate from the appendix which appears within normal limits in the right lower quadrant.  Cystic or mucinous neoplasm is not completely excluded.  Follow-up and surveillance is recommended.    Patient needs to follow up with [facility] as instructed    Recommendations routine screening and no risk factors:  Mammogram at age 40  Colonoscopy age 45  Bone density age 65  Return to clinic as needed for any problems.  Return to clinic in one year. It is recommended to have a physician breast exam and pelvic exam annually. This is different than doing a Pap smear.         As of April 1, 2021, the Cures Act has been  passed nationally. This new law requires that all doctors progress notes, lab results, pathology reports and radiology reports be released IMMEDIATELY to the patient in the patient portal. That means that the results are released to you at the EXACT same time they are released to me. Therefore, with all of the patients that I have I am not able to reply to each patient exactly when the results come in. So there will be a delay from when you see the results to when I see them and have time to come up with a response to send you. Also I only see these results when I am on the computer at work. So if the results come in over the weekend or after 5 pm of a work day, I will not see them until the next business day. As you can tell, this is a challenge as a physician to give every patient the quick response they hope for and deserve. So please be patient!     Thanks for understanding,

## 2025-06-09 ENCOUNTER — OCCUPATIONAL HEALTH (OUTPATIENT)
Dept: URGENT CARE | Facility: CLINIC | Age: 40
End: 2025-06-09

## 2025-06-09 DIAGNOSIS — Z02.83 ENCOUNTER FOR DRUG SCREENING: Primary | ICD-10-CM

## 2025-06-09 LAB — BREATH ALCOHOL: 0

## (undated) DEVICE — CONTAINER MULTIPURPOSE/SPECIME

## (undated) DEVICE — SUT 2/0 30IN SILK BLK BRAI

## (undated) DEVICE — CATH URETHRAL 16FR RED

## (undated) DEVICE — DRESSING TELFA N ADH 3X8

## (undated) DEVICE — SWAB PROCTO 16 X 1 1/2 ST 2/PK

## (undated) DEVICE — SEE MEDLINE ITEM 157116

## (undated) DEVICE — SEE MEDLINE ITEM 146355

## (undated) DEVICE — PAD PREP 50/CA

## (undated) DEVICE — SEE MEDLINE ITEM 156955

## (undated) DEVICE — DRAPE SURGICAL STERI IRRG PCH

## (undated) DEVICE — SUT 1 36IN GUT CHROMIC CT

## (undated) DEVICE — SEE MEDLINE ITEM 157117

## (undated) DEVICE — Device

## (undated) DEVICE — COVER OVERHEAD SURG LT BLUE

## (undated) DEVICE — SEE MEDLINE ITEM 154981

## (undated) DEVICE — GLOVE BIOGEL SKINSENSE PI 6.0

## (undated) DEVICE — PANTIES FEMININE NAPKIN LG/XLG